# Patient Record
Sex: MALE | Race: WHITE | NOT HISPANIC OR LATINO | Employment: FULL TIME | URBAN - METROPOLITAN AREA
[De-identification: names, ages, dates, MRNs, and addresses within clinical notes are randomized per-mention and may not be internally consistent; named-entity substitution may affect disease eponyms.]

---

## 2019-04-02 ENCOUNTER — HOSPITAL ENCOUNTER (OUTPATIENT)
Dept: NEUROLOGY | Facility: CLINIC | Age: 54
Setting detail: THERAPIES SERIES
Discharge: STILL ADMITTED OR ADMINISTRATIVE | End: 2019-04-02
Attending: NURSE PRACTITIONER

## 2019-04-02 DIAGNOSIS — F07.81 POST CONCUSSION SYNDROME: ICD-10-CM

## 2019-04-02 DIAGNOSIS — R79.89 LOW VITAMIN B12 LEVEL: ICD-10-CM

## 2019-04-02 DIAGNOSIS — Z76.89 RETURN TO WORK EVALUATION: ICD-10-CM

## 2019-04-02 DIAGNOSIS — R53.83 FATIGUE, UNSPECIFIED TYPE: ICD-10-CM

## 2019-04-02 DIAGNOSIS — G44.309 POST-CONCUSSION HEADACHE: ICD-10-CM

## 2019-04-02 DIAGNOSIS — F06.4 ANXIETY DISORDER DUE TO MEDICAL CONDITION: ICD-10-CM

## 2019-04-02 LAB
ALBUMIN SERPL-MCNC: 4 G/DL (ref 3.5–5)
ALP SERPL-CCNC: 61 U/L (ref 45–120)
ALT SERPL W P-5'-P-CCNC: 21 U/L (ref 0–45)
ANION GAP SERPL CALCULATED.3IONS-SCNC: 11 MMOL/L (ref 5–18)
AST SERPL W P-5'-P-CCNC: 20 U/L (ref 0–40)
BASOPHILS # BLD AUTO: 0.1 THOU/UL (ref 0–0.2)
BASOPHILS NFR BLD AUTO: 1 % (ref 0–2)
BILIRUB SERPL-MCNC: 0.4 MG/DL (ref 0–1)
BUN SERPL-MCNC: 9 MG/DL (ref 8–22)
CALCIUM SERPL-MCNC: 9.8 MG/DL (ref 8.5–10.5)
CHLORIDE BLD-SCNC: 101 MMOL/L (ref 98–107)
CO2 SERPL-SCNC: 28 MMOL/L (ref 22–31)
CREAT SERPL-MCNC: 0.9 MG/DL (ref 0.7–1.3)
CRP SERPL HS-MCNC: 2.4 MG/L (ref 0–3)
EOSINOPHIL # BLD AUTO: 0.1 THOU/UL (ref 0–0.4)
EOSINOPHIL NFR BLD AUTO: 1 % (ref 0–6)
ERYTHROCYTE [DISTWIDTH] IN BLOOD BY AUTOMATED COUNT: 13.1 % (ref 11–14.5)
ERYTHROCYTE [SEDIMENTATION RATE] IN BLOOD BY WESTERGREN METHOD: 6 MM/HR (ref 0–15)
FOLATE SERPL-MCNC: 4.8 NG/ML
GFR SERPL CREATININE-BSD FRML MDRD: >60 ML/MIN/1.73M2
GLUCOSE BLD-MCNC: 86 MG/DL (ref 70–125)
HCT VFR BLD AUTO: 46.3 % (ref 40–54)
HGB BLD-MCNC: 15.4 G/DL (ref 14–18)
LYMPHOCYTES # BLD AUTO: 1.6 THOU/UL (ref 0.8–4.4)
LYMPHOCYTES NFR BLD AUTO: 26 % (ref 20–40)
MAGNESIUM SERPL-MCNC: 2.6 MG/DL (ref 1.8–2.6)
MCH RBC QN AUTO: 31.8 PG (ref 27–34)
MCHC RBC AUTO-ENTMCNC: 33.3 G/DL (ref 32–36)
MCV RBC AUTO: 96 FL (ref 80–100)
MONOCYTES # BLD AUTO: 0.5 THOU/UL (ref 0–0.9)
MONOCYTES NFR BLD AUTO: 8 % (ref 2–10)
NEUTROPHILS # BLD AUTO: 3.9 THOU/UL (ref 2–7.7)
NEUTROPHILS NFR BLD AUTO: 63 % (ref 50–70)
PLATELET # BLD AUTO: 319 THOU/UL (ref 140–440)
PMV BLD AUTO: 10.9 FL (ref 8.5–12.5)
POTASSIUM BLD-SCNC: 4.1 MMOL/L (ref 3.5–5)
PROT SERPL-MCNC: 7.2 G/DL (ref 6–8)
RBC # BLD AUTO: 4.84 MILL/UL (ref 4.4–6.2)
SODIUM SERPL-SCNC: 140 MMOL/L (ref 136–145)
TSH SERPL DL<=0.005 MIU/L-ACNC: 0.78 UIU/ML (ref 0.3–5)
VIT B12 SERPL-MCNC: >2000 PG/ML (ref 213–816)
WBC: 6.2 THOU/UL (ref 4–11)

## 2019-04-03 LAB — 25(OH)D3 SERPL-MCNC: 28.8 NG/ML (ref 30–80)

## 2019-04-10 ENCOUNTER — HOSPITAL ENCOUNTER (OUTPATIENT)
Dept: PHYSICAL THERAPY | Age: 54
Setting detail: THERAPIES SERIES
Discharge: STILL ADMITTED OR ADMINISTRATIVE | End: 2019-04-10
Attending: NURSE PRACTITIONER

## 2019-04-10 DIAGNOSIS — R42 DIZZINESS: ICD-10-CM

## 2019-04-10 DIAGNOSIS — R26.89 UNSTABLE BALANCE: ICD-10-CM

## 2019-04-10 DIAGNOSIS — R53.83 FATIGUE, UNSPECIFIED TYPE: ICD-10-CM

## 2019-04-15 ENCOUNTER — HOSPITAL ENCOUNTER (OUTPATIENT)
Dept: NEUROLOGY | Facility: CLINIC | Age: 54
Setting detail: THERAPIES SERIES
Discharge: STILL ADMITTED OR ADMINISTRATIVE | End: 2019-04-15
Attending: PSYCHOLOGIST

## 2019-04-15 DIAGNOSIS — R53.83 FATIGUE, UNSPECIFIED TYPE: ICD-10-CM

## 2019-04-22 ENCOUNTER — AMBULATORY - HEALTHEAST (OUTPATIENT)
Dept: NEUROLOGY | Facility: CLINIC | Age: 54
End: 2019-04-22

## 2019-04-25 ENCOUNTER — HOSPITAL ENCOUNTER (OUTPATIENT)
Dept: PHYSICAL THERAPY | Age: 54
Setting detail: THERAPIES SERIES
Discharge: STILL ADMITTED OR ADMINISTRATIVE | End: 2019-04-25
Attending: PHYSICAL THERAPIST

## 2019-04-25 DIAGNOSIS — R26.89 UNSTABLE BALANCE: ICD-10-CM

## 2019-04-25 DIAGNOSIS — R42 DIZZINESS: ICD-10-CM

## 2019-04-29 ENCOUNTER — AMBULATORY - HEALTHEAST (OUTPATIENT)
Dept: NEUROLOGY | Facility: CLINIC | Age: 54
End: 2019-04-29

## 2019-04-29 DIAGNOSIS — F07.81 POST CONCUSSION SYNDROME: ICD-10-CM

## 2019-04-30 ENCOUNTER — HOSPITAL ENCOUNTER (OUTPATIENT)
Dept: NEUROLOGY | Facility: CLINIC | Age: 54
Setting detail: THERAPIES SERIES
Discharge: STILL ADMITTED OR ADMINISTRATIVE | End: 2019-04-30
Attending: NURSE PRACTITIONER

## 2019-04-30 DIAGNOSIS — G44.309 POST-CONCUSSION HEADACHE: ICD-10-CM

## 2019-04-30 DIAGNOSIS — Z76.89 RETURN TO WORK EVALUATION: ICD-10-CM

## 2019-04-30 DIAGNOSIS — F06.4 ANXIETY DISORDER DUE TO MEDICAL CONDITION: ICD-10-CM

## 2019-04-30 DIAGNOSIS — F07.81 POST CONCUSSION SYNDROME: ICD-10-CM

## 2019-05-02 ENCOUNTER — HOSPITAL ENCOUNTER (OUTPATIENT)
Dept: PHYSICAL THERAPY | Age: 54
Setting detail: THERAPIES SERIES
Discharge: STILL ADMITTED OR ADMINISTRATIVE | End: 2019-05-02
Attending: PHYSICAL THERAPIST

## 2019-05-02 DIAGNOSIS — R26.89 UNSTABLE BALANCE: ICD-10-CM

## 2019-05-02 DIAGNOSIS — R42 DIZZINESS: ICD-10-CM

## 2019-05-09 ENCOUNTER — HOSPITAL ENCOUNTER (OUTPATIENT)
Dept: PHYSICAL THERAPY | Age: 54
Setting detail: THERAPIES SERIES
Discharge: STILL ADMITTED OR ADMINISTRATIVE | End: 2019-05-09
Attending: PHYSICAL THERAPIST

## 2019-05-09 DIAGNOSIS — R42 DIZZINESS: ICD-10-CM

## 2019-05-09 DIAGNOSIS — R26.89 UNSTABLE BALANCE: ICD-10-CM

## 2019-05-23 ENCOUNTER — HOSPITAL ENCOUNTER (OUTPATIENT)
Dept: PHYSICAL THERAPY | Age: 54
Setting detail: THERAPIES SERIES
Discharge: STILL ADMITTED OR ADMINISTRATIVE | End: 2019-05-23
Attending: PHYSICAL THERAPIST

## 2019-05-23 DIAGNOSIS — R42 DIZZINESS: ICD-10-CM

## 2019-05-23 DIAGNOSIS — R26.89 UNSTABLE BALANCE: ICD-10-CM

## 2019-06-06 ENCOUNTER — HOSPITAL ENCOUNTER (OUTPATIENT)
Dept: PHYSICAL THERAPY | Age: 54
Setting detail: THERAPIES SERIES
Discharge: STILL ADMITTED OR ADMINISTRATIVE | End: 2019-06-06
Attending: PHYSICAL THERAPIST

## 2019-06-06 DIAGNOSIS — R26.89 UNSTABLE BALANCE: ICD-10-CM

## 2019-06-06 DIAGNOSIS — R42 DIZZINESS: ICD-10-CM

## 2019-06-17 ENCOUNTER — HOSPITAL ENCOUNTER (OUTPATIENT)
Dept: NEUROLOGY | Facility: CLINIC | Age: 54
Setting detail: THERAPIES SERIES
Discharge: STILL ADMITTED OR ADMINISTRATIVE | End: 2019-06-17
Attending: PSYCHOLOGIST

## 2019-06-17 DIAGNOSIS — R53.83 FATIGUE, UNSPECIFIED TYPE: ICD-10-CM

## 2019-06-17 DIAGNOSIS — F07.81 POST CONCUSSION SYNDROME: ICD-10-CM

## 2019-06-19 ENCOUNTER — HOSPITAL ENCOUNTER (OUTPATIENT)
Dept: PHYSICAL THERAPY | Age: 54
Setting detail: THERAPIES SERIES
Discharge: STILL ADMITTED OR ADMINISTRATIVE | End: 2019-06-19
Attending: PHYSICAL THERAPIST

## 2019-06-19 DIAGNOSIS — R26.89 UNSTABLE BALANCE: ICD-10-CM

## 2019-06-19 DIAGNOSIS — R42 DIZZINESS: ICD-10-CM

## 2019-07-08 ENCOUNTER — HOSPITAL ENCOUNTER (OUTPATIENT)
Dept: NEUROLOGY | Facility: CLINIC | Age: 54
Setting detail: THERAPIES SERIES
Discharge: STILL ADMITTED OR ADMINISTRATIVE | End: 2019-07-08
Attending: NURSE PRACTITIONER

## 2019-07-08 DIAGNOSIS — R41.3 MEMORY DIFFICULTIES: ICD-10-CM

## 2019-07-08 DIAGNOSIS — G44.309 POST-CONCUSSION HEADACHE: ICD-10-CM

## 2019-07-08 DIAGNOSIS — G47.00 INSOMNIA, UNSPECIFIED TYPE: ICD-10-CM

## 2019-07-08 DIAGNOSIS — Z76.89 RETURN TO WORK EVALUATION: ICD-10-CM

## 2019-07-08 DIAGNOSIS — F06.4 ANXIETY DISORDER DUE TO MEDICAL CONDITION: ICD-10-CM

## 2019-07-08 DIAGNOSIS — F07.81 POST CONCUSSION SYNDROME: ICD-10-CM

## 2019-09-09 ENCOUNTER — HOSPITAL ENCOUNTER (OUTPATIENT)
Dept: NEUROLOGY | Facility: CLINIC | Age: 54
Setting detail: THERAPIES SERIES
Discharge: STILL ADMITTED OR ADMINISTRATIVE | End: 2019-09-09
Attending: NURSE PRACTITIONER

## 2019-09-09 DIAGNOSIS — F07.81 POSTCONCUSSION SYNDROME: ICD-10-CM

## 2019-09-09 DIAGNOSIS — R41.840 ATTENTION AND CONCENTRATION DEFICIT: ICD-10-CM

## 2019-09-09 DIAGNOSIS — G44.309 POST-CONCUSSION HEADACHE: ICD-10-CM

## 2019-09-09 DIAGNOSIS — K91.1 DUMPING SYNDROME: ICD-10-CM

## 2019-09-09 DIAGNOSIS — Z76.89 RETURN TO WORK EVALUATION: ICD-10-CM

## 2019-09-09 DIAGNOSIS — F06.4 ANXIETY DISORDER DUE TO MEDICAL CONDITION: ICD-10-CM

## 2019-09-09 DIAGNOSIS — R41.3 MEMORY DIFFICULTIES: ICD-10-CM

## 2019-09-09 DIAGNOSIS — G47.00 INSOMNIA, UNSPECIFIED TYPE: ICD-10-CM

## 2019-10-15 ENCOUNTER — HOSPITAL ENCOUNTER (OUTPATIENT)
Dept: NEUROLOGY | Facility: CLINIC | Age: 54
Setting detail: THERAPIES SERIES
Discharge: STILL ADMITTED OR ADMINISTRATIVE | End: 2019-10-15
Attending: NURSE PRACTITIONER

## 2019-10-15 DIAGNOSIS — Z76.89 RETURN TO WORK EVALUATION: ICD-10-CM

## 2019-10-15 DIAGNOSIS — F06.4 ANXIETY DISORDER DUE TO MEDICAL CONDITION: ICD-10-CM

## 2019-10-15 DIAGNOSIS — R41.840 ATTENTION AND CONCENTRATION DEFICIT: ICD-10-CM

## 2019-10-15 DIAGNOSIS — F07.81 POSTCONCUSSION SYNDROME: ICD-10-CM

## 2019-10-15 DIAGNOSIS — R41.3 MEMORY DIFFICULTY: ICD-10-CM

## 2019-10-15 DIAGNOSIS — G47.00 INSOMNIA, UNSPECIFIED TYPE: ICD-10-CM

## 2019-10-15 DIAGNOSIS — G44.309 POST-CONCUSSION HEADACHE: ICD-10-CM

## 2019-11-20 ENCOUNTER — HOSPITAL ENCOUNTER (OUTPATIENT)
Dept: NEUROLOGY | Facility: CLINIC | Age: 54
Setting detail: THERAPIES SERIES
Discharge: STILL ADMITTED OR ADMINISTRATIVE | End: 2019-11-20
Attending: NURSE PRACTITIONER

## 2019-11-20 DIAGNOSIS — R41.840 ATTENTION AND CONCENTRATION DEFICIT: ICD-10-CM

## 2019-11-20 DIAGNOSIS — G47.00 INSOMNIA, UNSPECIFIED TYPE: ICD-10-CM

## 2019-11-20 DIAGNOSIS — F06.4 ANXIETY DISORDER DUE TO MEDICAL CONDITION: ICD-10-CM

## 2019-11-20 DIAGNOSIS — Z76.89 RETURN TO WORK EVALUATION: ICD-10-CM

## 2019-11-20 DIAGNOSIS — K91.1 DUMPING SYNDROME: ICD-10-CM

## 2019-11-20 DIAGNOSIS — Z02.6 ENCOUNTER RELATED TO WORKER'S COMPENSATION CLAIM: ICD-10-CM

## 2019-11-20 DIAGNOSIS — G44.309 POST-CONCUSSION HEADACHE: ICD-10-CM

## 2019-11-20 DIAGNOSIS — R53.83 FATIGUE, UNSPECIFIED TYPE: ICD-10-CM

## 2019-11-20 DIAGNOSIS — F07.81 POST CONCUSSION SYNDROME: ICD-10-CM

## 2019-12-03 ENCOUNTER — TRANSCRIBE ORDERS (OUTPATIENT)
Dept: OTHER | Age: 54
End: 2019-12-03

## 2019-12-03 DIAGNOSIS — K91.1 DUMPING SYNDROME: Primary | ICD-10-CM

## 2019-12-11 ENCOUNTER — DOCUMENTATION ONLY (OUTPATIENT)
Dept: GASTROENTEROLOGY | Facility: CLINIC | Age: 54
End: 2019-12-11

## 2019-12-11 NOTE — LETTER
December 18, 2019      TO: Dixon Good  [address]  [facility] [address]         I have reviewed your medical records and thank you for your time and effort in submitting them.    At this time, the demand for our services is very high and we are sorry that we cannot see you at this time.  We recommend that you see your previous gastroenterologist or the contact a local GI provider from the list below.      Sincerely,  Hui Landis MD  Department of Gastroenterology [facility]  [phone]    [facility]  [phone]    [facility]  [phone]    [facility]  [phone]      Sincerely,      Hui Landis MD

## 2019-12-11 NOTE — PROGRESS NOTES
[facility] Provider and [facility]     Diagnosis:  Dumping syndrome     GI notes or primary provider notes related to GI problem:   Y     Pathology Reports: N    Recent Lab Reports: Y    Radiology Reports (CT/MRI) : Y    Endoscopy:  N    Colonoscopy: N    Notes: Recs fwd to  Boby GAVIRIA to review 12/11/19 1:14PM        Referral Date: 12/11/19    Date complete records received and sent for review:     Date records scanned into epic:     Provider Review Date:     Date review routed back to sender:     Letter sent:     Notes:

## 2019-12-16 NOTE — PROGRESS NOTES
REFERRAL REVIEW FORM    Is this a second opinion from another GI physician?  (If yes, OK to refer to for an MD only clinic visit)  No    Reason for referral: dumping syndrome    Date records reviewed: 12/2019    Previous work up:    Labs  CT scan    Summary of pertinent GI work-up:  S/p nissen fundoplication, c/b dumping syndrome on Sandostatin injections. Also had hiatal hernia repair and appendectomy.  Per records, patient reporting dumping syndrome, but we do not have records to support this.     CT a/p 5/2018 showed    1. Continued slight inflammatory stranding in the low pelvis at the level of the rectosigmoid and presacral region, with improved bowel wall thickening from the 5/25/2018 exam. Trace fluid in the pelvis, nonspecific. Direct visualization may be warranted.  2. Diverticulosis without evidence of active inflammation.    Recommendation:    Do not schedule--send letter to patient with other list of GI providers - CC to PCP    Comments:   Patient has postsurgical dumping syndrome, despite Sandostatin therapy.  He is unlikely to benefit from a GI clinic visit, given medical options have been exhausted.  If he has a truly refractory condition, surgical evaluation may be needed.                                      no

## 2020-01-02 ENCOUNTER — HOSPITAL ENCOUNTER (OUTPATIENT)
Dept: NEUROLOGY | Facility: CLINIC | Age: 55
Setting detail: THERAPIES SERIES
Discharge: STILL ADMITTED OR ADMINISTRATIVE | End: 2020-01-02
Attending: NURSE PRACTITIONER

## 2020-01-02 DIAGNOSIS — F07.81 POST CONCUSSION SYNDROME: ICD-10-CM

## 2020-01-02 DIAGNOSIS — Z02.6 ENCOUNTER RELATED TO WORKER'S COMPENSATION CLAIM: ICD-10-CM

## 2020-01-02 DIAGNOSIS — R53.83 FATIGUE, UNSPECIFIED TYPE: ICD-10-CM

## 2020-01-02 DIAGNOSIS — G47.00 INSOMNIA, UNSPECIFIED TYPE: ICD-10-CM

## 2020-01-02 DIAGNOSIS — F06.4 ANXIETY DISORDER DUE TO MEDICAL CONDITION: ICD-10-CM

## 2020-01-02 DIAGNOSIS — G44.309 POST-CONCUSSION HEADACHE: ICD-10-CM

## 2020-03-21 ENCOUNTER — COMMUNICATION - HEALTHEAST (OUTPATIENT)
Dept: NEUROLOGY | Facility: CLINIC | Age: 55
End: 2020-03-21

## 2020-03-21 DIAGNOSIS — F06.4 ANXIETY DISORDER DUE TO MEDICAL CONDITION: ICD-10-CM

## 2021-05-27 NOTE — PROGRESS NOTES
Assessment:     1. Concussion, without loss of consciousness.    2. Post concussion syndrome  3. Dizziness  4. Headaches    Plan:     Ordered today: PT to evaluate and treat, Wellbutrin and amitriptyline, neuropsych is on hold until Workmen's Comp. Approves, Labs        Neuropsychological assessment   Yes  (2 hours), can count case this will be on hold Workmen's Comp. approves  Pain control for headaches - Tylenol only due to rebound headaches, Kiley/blue tinted glasses  Current PT  No      PT to evaluate and treat  Yes  Current OT  No     OT to evaluate and treat  No  Current ST  No     ST to evaluate and treat  No  Current care        Psychiatrist currently No  Past:  No       Psychologist currently No  Past:  No       Primary: Currently Yes                   Referral to psychology No  MRI/CT Completed  Yes     Ordered today : No  Labs Completed  No        Ordered today : Yes  Sleeping Problems-monitor, sleep hygiene          Currently on medication  No           New med  Yes, amitriptyline  Anxiety due to concussion - monitor        Currently on medication  No          New med  Yes, Wellbutrin     Decreased concentration and focus - monitor        Currently on medication No         New med  Yes, Wellbutrin     Work note written today   Yes     Date of accident: 3/1/19  Workman's Comp   Yes   [facility]   No    Discussed: Yes   Present: No         Discussed: RED FLAGS NEEDING ACUTE EMERGENCY MANAGEMENT:                          Headaches that worsen                          Seizures                          Focal Neurologic Signs                          Drowsiness/Lethargy                          Repeated vomiting                          Slurred Speech                          Inability to recognize people/places                          Increasing confusion/irritability                          Weakness/numbness                          Neck pain                          Unusual behavioral change                           Change in level of consciousness    Subjective:          HPI  He is a 54 y.o. male who presents with a blunt/closed head injury which he sustained while at work on 3/11/19.  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 He hit his head the ball of the trailer hitch, and then maybe again on the ground. He went inside and started working on a computer, he had a headache and started getting dizzy and nauseated so went to the ER. He had a CT scan preformed, diagnosed him with a concussion and sent him home to rest for a couple of days    Most severe symptoms: he has had a headache and ringing in his ear     Injury Description:               Was there a forcible blow to the head?:                     Yes                          Evidence of intracranial injury or skull fracture?:        No                            Location of Impact on the head:        Left side of head                               Retrograde Amnesia (loss of memory of events before the injury)?:  No  Anterograde Amnesia (loss of memory of events following injury)?:  No  Number of previous head injuries.                                                       0  Loss of Consciousness:                                                                      No    Early Signs:  Symptoms were first noted when     About 15 minutes later                           Appears dazed or stunned:                                         Yes              Word finding issues/trouble completing thought         Yes              Confusion about events:                                             No              Seizures:                                                                     No                          Headache                                                                   Yes              Body pain                                                                    No              Nausea                                                                       Yes    Headaches:   Significant ongoing headaches Yes  Headaches are Daily and Continuously    He indicated that approximately every three  days these headaches are particularly bad such that he would rate them as a 8 on a 1-10 rating scale. On average he would describe his headaches as being at about a 4/10. At his best his headaches are a 2/10. The patient reports computers, stress and light makes his symptoms worse, and rest makes his symptoms better. He indicated that he takes acetaminophen (Tylenol) and it helps, but not always.     Physical Symptoms:  Headache-Yes      Resolved No           Improved since accident Yes   Nausea- Yes      Resolved No        Improved since accident Yes      Vomiting - No        Balance problems - Yes       Resolved No Improved since accident Yes   Dizziness - Yes      Resolved No          Improved since accident No   Visual problems - No        Fatigue - Yes     Resolved No           Improved since accident Yes   Sensitivity to light - Yes     Resolved No         Improved since accident No   Sensitivity to sound - No      Numbness/tingling - No          Cognitive Symptoms  Feeling mentally foggy - Yes         Resolved No       Improved since accident Yes   Feeling slowed down - Yes         Resolved No         Improved since accident Yes   Difficulty Concentrating- Yes        Resolved No        Improved since accident Yes   Difficulty remembering - Yes         Resolved No       Improved since accident Yes     Emotional Symptoms  Irritability - No          Sadness-   No       More emotional - No        Nervousness/anxiety - No             Psychiatric History:  Anxiety - No  Depression - No  Sleep Disorders - No  The patient denies being a victim of abuse.     Ever Hospitalized for mental health:             No  Any thought of hurting self or others now?   No  Any history of  hurting self or others?            No    Family Psychiatric History:  Mother's side                              No  Father's side                               No  Adopted                                      No    Sleep History:  Drowsiness- Yes         Resolved No        Improved since accident Yes  Sleep less than usual - Yes  Sleep more than usual - No  Trouble falling asleep - Yes       Resolved No        Improved since accident Yes   Does the patient wake feeling rested - No       Resolved No         Improved since accident No      Previous concussions  No     Migraine Headaches      Patient history of migraines.    Yes      Family history of migraines    Yes    Exertion:         Do the above stated symptoms worsen with physical activity? Yes        Do the above stated symptoms worsen with cognitive activity? Yes         Work/School        Do the above stated symptoms worsen with school/work?        Yes        Have your returned to work/school?            Yes, he was told to take a week off, but after 3 days tried to drive and got really dizzy, so took full week off and then went on vacation        Any days off?                                Yes, 2 weeks          There are no active problems to display for this patient.    No past medical history on file.  No past surgical history on file.  No family history on file.  Current Outpatient Medications   Medication Sig Dispense Refill     ibuprofen (ADVIL,MOTRIN) 600 MG tablet Take 600 mg by mouth.       losartan (COZAAR) 100 MG tablet   0     octreotide (SANDOSTATIN LAR DEPOT) 30 mg IM injection Inject 30 mg into the shoulder, thigh, or buttocks.       octreotide (SANDOSTATIN) 100 mcg/mL Soln Inject 100 mcg under the skin.       ondansetron (ZOFRAN ODT) 4 MG disintegrating tablet Take 1 tablet (4 mg total) by mouth every 8 (eight) hours as needed for nausea. 10 tablet 0     promethazine (PHENERGAN) 25 MG tablet   0     SUMAtriptan (IMITREX) 100 MG tablet Take  100 mg by mouth.       SUMAtriptan (IMITREX) 100 MG tablet   0     SUMAtriptan (IMITREX) 50 MG tablet   3     urea (CARMOL) 40 % Crea Apply to affected area daily.       No current facility-administered medications for this encounter.        Allergies   Allergen Reactions     Flagyl [Metronidazole]      Social History     Socioeconomic History     Marital status:      Spouse name: Not on file     Number of children: Not on file     Years of education: Not on file     Highest education level: Not on file   Occupational History     Not on file   Social Needs     Financial resource strain: Not on file     Food insecurity:     Worry: Not on file     Inability: Not on file     Transportation needs:     Medical: Not on file     Non-medical: Not on file   Tobacco Use     Smoking status: Not on file   Substance and Sexual Activity     Alcohol use: Not on file     Drug use: Not on file     Sexual activity: Not on file   Lifestyle     Physical activity:     Days per week: Not on file     Minutes per session: Not on file     Stress: Not on file   Relationships     Social connections:     Talks on phone: Not on file     Gets together: Not on file     Attends Rastafari service: Not on file     Active member of club or organization: Not on file     Attends meetings of clubs or organizations: Not on file     Relationship status: Not on file     Intimate partner violence:     Fear of current or ex partner: Not on file     Emotionally abused: Not on file     Physically abused: Not on file     Forced sexual activity: Not on file   Other Topics Concern     Not on file   Social History Narrative     Not on file       The following portions of the patient's history were reviewed and updated as appropriate: allergies, current medications, past family history, past medical history, past social history, past surgical history and problem list.    Smoke History:      Review of Systems  A comprehensive review of systems was negative  "except for: headache, ear ringing    Patient History  Patient was referred to the concussion clinic by ER. The patient was born in [place], and came to [place] when he was a child and has lived here for most of his life. He is currently living with his wife of 35  years in their family home.     Currently employed as a  of an auto body store and works at [employer].  The concussion symptoms are limiting his ability to work, his symptoms will flair up when at work. His concussion is work related.   Personal interests include water skiing, hunt, and scuba dive. The patient has not been able to do these activities since his injury, he did go scuba diving and \"pushed through it\". He normally exercises never. He reports having good grades through high school and college. He reports learning best by watching and reading. He denies any developmental problems, learning disabilities, or history of ADHD. He does not have any culture or Nondenominational concerns regarding his treatment. The patient did not see his primary after the injury   The patient reports that his activities since the injury have been trying to get back to his normal activity.  Patient reports being 85 percent back to his normal activity.  The patient denies any unexplained weight loss or gain. The patient reports that the symptoms wake him up at night. He denies feeling down, depressed, or hopeless. The patient reports being bothered by having little interest or pleasure in doing things.     Objective:       Discussion was held with the patient today regarding concussion in general including types of injury, symptoms that are common, treatment and variability in time to recover. I also provided written information about concussion and symptoms that would apply to him in his concussion folder. Education about concussion symptoms and length of time it would take the patient to recover was also given to the patient.  I have reassured the patient his " symptoms are very common when a concussion is present and will improve with time. We discussed the risks and benefits of the medication including risk of worsening depression with medication adjustments and even the possibility of emergence of suicidal ideations.       Total time spent with the patient today was 60 minutes with greater than 50% of the time spent in counseling and care coordination. The patient will return in about 4 weeks to this clinic for further assessment and treatment. He agrees to call before then with any questions, concerns or problems. We will assess for the appropriateness of possible psychotropic medication trials/changes. The patient will seek out appropriate emergency services should that become necessary.I will be available if any questions, concerns, or problems that arise.     Physical Exam: General appearance: alert, appears stated age, cooperative and no distress. Yes  Sensitivity to light. Yes  Head: Normocephalic, without obvious abnormality, atraumatic Yes  Neck:  Full ROM  Yes with pain or stiffness Yes    Neurologic:   Mental status: Alert, oriented, thought content appropriate, affect: mood-congruent. Recent and remote memory grossly intact.  Yes  Speech is clear and fluent with no obvious word finding or paraphasic errors. Yes  Pupils are equal and react to light direct and consensual accommodation.Yes  EOMs are intact   Yes  nystagmus. No   Exophoria/exotropia noted. No  Visual fields are grossly full. Yes  VOR grossly intact. Yes  Saccade and smooth pursuit grossly intact. Yes  Full facial movements, Yes  Tongue protrudes midline soft palate rises symmetrically. Yes  Shoulder shrug is intact. Yes  Strength is 5/5, No pronator drift. Yes  Accurate finger to nose and sensation is intact to double simultaneous stimulation.Yes   Reflexes are symmetrical Yes  Toes are down going. Yes  Romberg is positive   Able to toe, heel, and tandem walk without difficulty No      Mental  Status Examination  Patient is casually dressed and seated for evaluation. He is cooperative with questioning and eye contact is good. He is fully engaged in conversation today. He is alert and fully oriented. Speech is normal. Thought processes normal with normal prehension and expression. Thoughts are organized and linear. Content is pertinent to the conversation and without evidence of auditory or visual hallucinations. No delusional ideation. Affect/mood is euthymic-bright, even. Gen. fund of knowledge, insight and memory are normal.

## 2021-05-27 NOTE — PROGRESS NOTES
"Physical Therapy  PHYSICAL THERAPY INITIAL CONCUSSION ASSESSMENT    Date: 4/10/2019                        Date of Injury: 3/11/19    Subjective:   Mechanism of Injury:  Per ED and neuropsychological report: He patient reports that he was walking into work when he slipped on ice and fell backwards hitting and bouncing the left side of his head off the trailer hitch of his truck. Patient did not lose consciousness. He describes the event well and has no retrograde amnesia. Shortly after the fall he developed a headache, dizziness, blurred vision, nausea and bilateral ear ringing so he drove himself to the ER.  Went on trip to [place] immediately after and did activities such as helicopter ride, boat ride, scuba diving, etc. and had increased symptoms of dizziness/headache with all activities.     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     Current level of function:  Still working full time, but has moved his work station to a room with dimmed lights and just one computer instead of two.  Plans to begin water skiing as soon as the ice is off the water and has more carpentry projects to begin in the spring.      Medical History: Bilateral shoulder surgical repairs, has \"broken almost every bone in my body\", negative hx of concussions, poor sleep hygiene at baseline (\"I have a hard time shutting off\")     Ongoing symptoms:  Headaches, dizziness, nausea, bilateral tinnitus      Headaches:     -Frequency:  Daily, most noticeable in the morning and when concentrating at work (on computer screen)    -Location:  Behind eyes, changes between R, L and Bilat    -Description of pain:  Sharp to dull   -Exacerbating factors:  Bright lights, concentrating on things, loud noises   -Relieving factors:  Rest, dark/dimmed lighting   -Headache history:  Hx of migraines since 11 y/o   -Current: " " 2/10, Best:  2/10, Worst:  6-7/10    Neck Pain:   -No new complaints since injury; 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 that occasionally gives him pain but it is tolerable     Dizziness:   -Frequency: Daily    -Description of dizziness without using the word \"dizzy\": Lightheaded, nauseous, unsteady    -Exacerbating factors:  Looking back and forth between computer screens, focusing on screen while scrolling through large documents, with head turns, with driving   -Relieving factors: Subsides on its own after a few minutes   -Dizziness history:  New since injury    -Current:  1/10, Best:  1/10, Worst:  5/10    Balance:   -When do you lose balance?:  No balance deficits at baseline, but since injury he has noticed more difficulty with tandem/feet together activities; No changes in balance with ADL's.     -Recent falls:  None    Pain rating: see above  Location: see above       Objective:  ROM: Cervical       Flexion: WNL        Extension: WNL        Right Rotation: WNL       Left Rotation: WNL       Right Side Bending: WNL      Left Side Bending: WNL         Postural Assessment: Slight rounded shoulders, posterior pelvic tilt     Vestibular/Balance  Gait:Normal    Vertebral Basilar Artery: NT   Ocular AROM: Normal  Smooth Pursuit: Normal; provoked dizziness symptom  Saccades: Normal    Positional Tests: R Rj-Hallpike NT                                              L Rj--Hallpike NT      VOR Cancellation: Positive saccadic movements, mild symptom provocation Slow VOR: Negative  Right Head Impulse Test: Positive (more significant than the L)  Left Head Impulse Test: Positive    Dynamic Visual Acuity: 2 line change     Infrared goggle assessment:  -Gaze induced nystagmus R - Negative  -Gaze induced nystagmus L - Negative  -Pressure induced nystagmus R - Negative  -Pressure induced nystagmus L - Negative  -Head shake nystagmus - Positive, with 3 beats to R      Sensory Organization Tests:  MCTSIB: NSEO Normal       " PSEO Normal       NSEC Normal                  PSEC Normal; mild postural sway    (Functional Gait Assessment) FGA: Tested   Functional Gait Assessment:  Total Score: 22/30   Gait Level surface: 3 (Normal) - 4.99 sec   Change in Gait Speed: 3 (Normal) - mild increase in dizziness symptoms   Gait with horizontal head turns: 0 (Severe) - slowed pace, staggers, LOB corrected with Kermit from therapist, significant increase in dizziness symptoms   Gait with vertical head turns:  1 (Moderate) - slowed pace, mod path deviation, mod increase in dizziness symptoms    Gait and Pivot Turn: 3 (Normal)   Step Over Obstacle: 3 (Normal)   Gait with Narrow Base of Support: 3 (Normal) - mild increase in dizziness symptoms   Gait with Eyes Closed: 1 (Severe) - 5.82 sec, significant path deviation to R    Ambulating Backwards: 2 (Mild) - 6.97 sec, slowed pace   Steps: 3 (Normal)       Single Leg Stance: Not Tested        Initial Treatment: 1 NR (10 min)   -Gaze stabilization x 1 in standing position with blank background and target 2-3' away and standing position:   Horizontal x 1 rep - 15 sec    Vertical x 1 rep - 18 sec    -Issued gaze stab to HEP  -Education on vestibular system and hypofunction.  All questions addressed.  -Instructed to maintain current activity level    Therapist Recommendations:  Impairments identified; See POC for goals and scheduled for subsequent visits      Rx Units: Eval, NR  Total Minutes: 60    SPT under direct supervision of PT with PT directing treatment and plan of care.    Mike Figueroa, PT, DPT, NCS

## 2021-05-27 NOTE — PROGRESS NOTES
Concussion date/cause of injury: AROUND MARCH 11, 2019- slipped on ice and fell hitting head onto trailer hitch right about the left ear.  NO LOC.    How have you been doing since we last saw you? any concerns? Symptoms? Mild Nausea, HA's, Ringing in the ears, Short term memory loss, Dizziness, Sleeping inconsistent.     NEW PT'S: Are you currently taking any PT or OT at any other facility?   None

## 2021-05-27 NOTE — PROGRESS NOTES
.The patient was seen for a neuropsychological evaluation for the purposes of diagnostic clarification and treatment planning. 35 minutes of face-to-face testing were provided by this writer. An additional 12 minutes were spent scoring and compiling test results.The patient was cooperative with testing. No concerns were brought to my attention. Please see Dr. Jacques's report for a detailed description of the charges and interpretation and integration of the findings.
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
## 2021-05-28 ENCOUNTER — RECORDS - HEALTHEAST (OUTPATIENT)
Dept: ADMINISTRATIVE | Facility: CLINIC | Age: 56
End: 2021-05-28

## 2021-05-28 NOTE — PROGRESS NOTES
Assessment:     1. Post Concussion Syndrome  2. Post Concussion Headache  3. Anxiety d/t concussion    Discussed: RED FLAGS NEEDING ACUTE EMERGENCY MANAGEMENT:                          Headaches that worsen                          Seizures                          Focal Neurologic Signs                          Drowsiness/Lethargy                          Repeated vomiting                          Slurred Speech                          Inability to recognize people/places                          Increasing confusion/irritability                          Weakness/numbness                          Neck pain                          Unusual behavioral change                          Change in level of consciousness      The patient returns to the concussion clinic for a follow up appointment, he was last seen by me on 4/12/19, where I prescribed amitriptyline and Wellbutrin, and referred the patient for PT and neuropsychological testing.   Patient reports he was seen in the ER due to the dumping syndrome.  The patient reports pushing himself a bit at work, but he does realize how he needs to slow down and take breaks in order to improve his symptoms.  Amitriptyline is helping him sleep and now he is waking feeling rested.  Wellbutrin is also decreased his irritability and help his focus.  Overall the patient reports improvement in physical, cognitive, and emotional concussive symptoms.  Patient like to stay at 6 hours 5 days a week.  Long discussion was held with the patient about concussions and treatment plan, patient agrees that he needs to take his breaks and listen to his brain.    Plan:     Increase Wellbutrin 300 mg    Neuropsychological assessment completed    Yes, he exhibited borderline to mildly impaired verbal memory and low average visual memory abilities.  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  Mr. Good is expected to continue to improve with regard to his cognition and physical symptoms in coming weeks; however, we will need to track his cognitive recovery over time.  At this point, there is no evidence of a clinically significant adverse emotional reaction to his injury.      Pain control for headaches - Tylenol only due to rebound headaches, Kiley/blue tinted glasses  Currently doing PT  Yes   Completed No   Currently doing OT  No   Completed No    Currently doing ST   No   Completed No   Psychology  No    MRI    Completed Yes, head CT was reportedly negative for acute intracranial pathology.   Labs Completed Yes      Any new medication (other provider):   No   Meds started at last appointment  Yes   Meds increased at last appointment    No     Sleeping Problems-        Currently on medication  Yes   How much taking  Amitriptyline         New med  No   Increased/lowered  No, amitriptyline  Anxiety due to concussion -        Currently on medication  Yes   How much taking Wellbutrin 150 mg       New med  No    Increased/lowered  Yes  Decreased concentration and focus -       Currently on medication Yes   How much taking Wellbutrin 150 mg      New med  No    Increased/lowered  Yes, increased to 300 mg  Work note written today   Yes       Subjective:          HPI    He is a 54 y.o. male who initially presented to the concussion clinic on 4/12/19 with a blunt/closed head injury which he sustained while at work on 3/11/19.  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 He hit his head the ball of the trailer hitch, and then maybe again on the ground. He went inside and he attempted to return to work immediately after his fall. However, within 15 minutes of working on his computer he developed a headache, nausea and dizziness.  He was seen in the ER, where  a head CT was negative and he was diagnosed with a concussion and encouraged to rest. After taking two weeks off of work, he attempted to return to full-time work and experienced an exacerbation of his symptoms.        Date of accident :                                                           Workman's Comp   Yes   [facility]   No, no problems at this time    Headaches:  Significant ongoing headaches Yes  Headaches: Intermittently and Daily  Improvement :Yes   Current Headache Yes   Wake with HA sometimes      Worse Headache    3/10           How often: Couple times a week  Average Headache 2/10.    Best Headache 0/10.  Brings on HA:   Overdoing it  Makes symptoms worse conference calls  Makes symptoms better.  Rest and breaks  Taking patient reports he is not taking any over-the-counter medications          Physical Symptoms:  Headache-Yes             Improved since last visit  Yes   Nausea-No              Vomiting -No             Balance problems - Yes       Improved since last visit    Yes           Dizziness - Yes         Improved since last visit    Yes          Visual problems - No            Fatigue - Yes           Improved since last visit  Yes           Sensitivity to light - Yes        Improved since last visit    Yes          Sensitivity to sound - Yes           Improved since last visit  Yes          Numbness/tingling - No          Cognitive Symptoms  Feeling mentally foggy -Yes          Improved since last visit   Yes         Feeling slowed down -Yes           Improved since last visit  Yes         Difficulty Concentrating- Yes           Improved since last visit  Yes           Difficulty remembering - Yes         Improved since last visit  Yes           Emotional Symptoms  Irritability - Yes            Improved since last visit Yes           Sadness-  No         More emotional - No             Nervousness/anxiety -Yes        Improved since last visit Yes           Psychiatric History:  Anxiety -  No  Depression - No  Sleep Disorders - No  Any thought of hurting self or others now?   No  Any history of hurting self or others?            No    Sleep History:  Drowsiness- Yes           Improved since last visit  Yes            Sleep less than usual - No  Sleep more than usual - No  Trouble falling asleep - No       Improved since last visit  Yes            Does the patient wake feeling rested - Yes         Improved since last visit Yes           Migraine Headaches      Patient history of migraines.    Yes      Exertion:         Do the above stated symptoms worsen with physical activity? Yes           Improved since last visit  Yes                  Do the above stated symptoms worsen with cognitive activity? Yes         Improved since last visit  Yes                  Work/School        Do the above stated symptoms worsen with school/work?        Yes        Have your returned to work/school? Yes  Hours a day  6   Days a week  5            Number of Days that you needed to leave or miss     0               There are no active problems to display for this patient.    No past medical history on file.  No past surgical history on file.  No family history on file.  Current Outpatient Medications   Medication Sig Dispense Refill     amitriptyline (ELAVIL) 25 MG tablet Take 1 tablet (25 mg total) by mouth at bedtime. 60 tablet 2     buPROPion (WELLBUTRIN XL) 150 MG 24 hr tablet Take 1 tablet (150 mg total) by mouth daily. 30 tablet 2     ibuprofen (ADVIL,MOTRIN) 600 MG tablet Take 600 mg by mouth.       losartan (COZAAR) 100 MG tablet   0     octreotide (SANDOSTATIN LAR DEPOT) 30 mg IM injection Inject 30 mg into the shoulder, thigh, or buttocks.       octreotide (SANDOSTATIN) 100 mcg/mL Soln Inject 100 mcg under the skin.       ondansetron (ZOFRAN ODT) 4 MG disintegrating tablet Take 1 tablet (4 mg total) by mouth every 8 (eight) hours as needed for nausea. 10 tablet 0     promethazine (PHENERGAN) 25 MG tablet   0      SUMAtriptan (IMITREX) 100 MG tablet Take 100 mg by mouth.       SUMAtriptan (IMITREX) 100 MG tablet   0     SUMAtriptan (IMITREX) 50 MG tablet   3     urea (CARMOL) 40 % Crea Apply to affected area daily.       No current facility-administered medications for this encounter.        Allergies   Allergen Reactions     Flagyl [Metronidazole]      Social History     Socioeconomic History     Marital status:      Spouse name: Not on file     Number of children: Not on file     Years of education: Not on file     Highest education level: Not on file   Occupational History     Not on file   Social Needs     Financial resource strain: Not on file     Food insecurity:     Worry: Not on file     Inability: Not on file     Transportation needs:     Medical: Not on file     Non-medical: Not on file   Tobacco Use     Smoking status: Not on file   Substance and Sexual Activity     Alcohol use: Not on file     Drug use: Not on file     Sexual activity: Not on file   Lifestyle     Physical activity:     Days per week: Not on file     Minutes per session: Not on file     Stress: Not on file   Relationships     Social connections:     Talks on phone: Not on file     Gets together: Not on file     Attends Anabaptism service: Not on file     Active member of club or organization: Not on file     Attends meetings of clubs or organizations: Not on file     Relationship status: Not on file     Intimate partner violence:     Fear of current or ex partner: Not on file     Emotionally abused: Not on file     Physically abused: Not on file     Forced sexual activity: Not on file   Other Topics Concern     Not on file   Social History Narrative     Not on file       The following portions of the patient's history were reviewed and updated as appropriate: allergies, current medications, past family history, past medical history, past social history, past surgical history and problem list.    Review of Systems  A comprehensive review of  systems was negative except for: What is noted above    Objective:       Discussion was held with the patient today regarding concussion in general including types of injury, symptoms that are common, treatment and variability in time to recover. I also provided written information about concussion and symptoms that would apply to him in his concussion folder. Education about concussion symptoms and length of time it would take the patient to recover was also given to the patient.  I have reassured the patient his symptoms are very common when a concussion is present and will improve with time. We discussed the risks and benefits of the medication including risk of worsening depression with medication adjustments and even the possibility of emergence of suicidal ideations.       Total time spent with the patient today was 30 minutes with greater than 50% of the time spent in counseling and care coordination. The patient will return in about 4 weeks to this clinic for further assessment and treatment. He agrees to call before then with any questions, concerns or problems. We will assess for the appropriateness of possible psychotropic medication trials/changes. The patient will seek out appropriate emergency services should that become necessary.I will be available if any questions, concerns, or problems that arise.     Mental Status Examination  Patient is casually dressed and seated for evaluation. He is cooperative with questioning and eye contact is good. He is fully engaged in conversation today. He is alert and fully oriented. Speech is normal. Thought processes normal with normal prehension and expression. Thoughts are organized and linear. Content is pertinent to the conversation and without evidence of auditory or visual hallucinations. No delusional ideation. Affect/mood is euthymic-bright, even. Gen. fund of knowledge, insight and memory are normal.

## 2021-05-28 NOTE — PROGRESS NOTES
After speaking with the patient and his referring nurse practitioner, speech and language therapy services were felt to be needed. Unfortunately I was unable to enter those orders retrospectively in Epic, the request for an order for outpatient SLP will be sent to Mavis Ross CNP.

## 2021-05-29 NOTE — PROGRESS NOTES
"Physical Therapy  Physical Therapy Daily Outpatient Documentation   DISCHARGE SUMMARY                              Rx Units: 2- NR                        Total OP Minutes: 24  Treatment #: 7/8     Pain: 0/10, 0/10, 4/10  Location: headache, \"dizzy\", R hip  Intervention: see below     Subjective: Patient went to [place] for an extended time.  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 He has had headaches 2-3x/week and developed more frequent headaches during work and screen time.  Average headache intensity 4-5/10 and lasts a couple of hours in duration.  In terms of his dizziness, his last bout was about 1 week ago when he was bending down a lot.  Overall his balance has been improving as he was able to walk on a narrow beam while carrying wood without falling.  He continues to experience path deviations when walking up a ramp and performing head turns while looking out windows. Patient reports compliance to his HEP.   Since last session, he has developed R hip pain and is scheduled to have an MRI for management.         Therapeutic Exercise  Total Minutes:     Neuro Re-Ed/Balance  Total Minutes: 24     Functional Gait Assessment:  Total Score: 30/30 (<24/30 = fall risk)   Gait Level surface: 3 - Normal 4.4 seconds   Change in Gait Speed: 3 - Normal   Gait with horizontal head turns: 3 - Normal (<6\" path deviation to R)   Gait with vertical head turns:  3 - Normal (<6\" path deviation to R)   Gait and Pivot Turn: 3 - Normal   Step Over Obstacle: 3 - Normal   Gait with Narrow Base of Support: 3 - Normal   Gait with Eyes Closed: 3 - Normal 5.4 seconds (<6\" path deviation to R during last 3 feet)   Ambulating Backwards: 3- Normal 5.71 seconds   Steps: 3 - Normal  Pt educated on score and fall risk implications  All questions addressed.       Pt educated on progression of HEP and areas to focus on in order to continue self managing lingering symptoms:   -reducing size of target with gaze stab " "exercises  -busy or moving backgrounds  -dynamic walking with gaze stab  -tandem stance with PNF patterns and head turns,  -rocker board PNF pattern with head turns, gaze stab on board  -walking program with head turns  -All questions addressed.            HEP:  -gaze stabilization on busy background, with walking, tandem, on balance board, 45 degree head turn hold, next to TV  -semi-sharpened romberg standing balance activities on unstable surface: head turns, PNF, arm movements   -dynamic gait activities: head turns, backwards, turns, quick change in directions, tandem  -habituation exercises: reaching for foot in seated and standing, 360 degree turns.   -saccades - next to TV: horizontal and diagonal targets     Assessment/Plan for week of 6/17/2019-6/21/2019:  Patient has been seen for 7 PT sessions from 4/10/2019 to 6/19/2019 for management of dizziness, balance impairments and headache after sustaining a concussion at work when he fell. Patient presented to PT with balance issues and scored high fall risk on the Functional Gait Assessment (22/30) on initial evaluation. His balance has greatly improved and he now scores 30/30 on the Functional Gait Assessment.  He continues to demonstrated mild path deviations <6\" to the right with head turns and eyes closed, but is able to self correct. He has returned to water skiing, however has not been competing as he becomes fatigued with a significant amount of physical activity. Patient has noticed mild balance issues when he completes high level balance activities and multitasking and at baseline it is assumed that he was functioning at a very high level given his hobbies including slalom water skiing.  He has been able to tolerate heavy labor without symptoms while on vacation, however continues to have headaches with screens.  Overall his dizziness has significantly reduced and in the past week has only experienced symptoms with repetitive bending.  He is currently " independent with a HEP to self manage lingering symptoms.  Patient will be discharged from skilled PT at this time; 3/4 PT goals have been met.  Patient is in agreement with plan.         Functional Goals:  to be met by 8 visits  -Pt will report decreased intensity of headache pain to 2/10 at worst - NOT MET - provoked by screens.   -Pt will report decreased intensity of dizziness symptoms to 1/10 at worst - MET  -Pt will score >/= 28/30 on FGA to demonstrate improved balance in order to return to high level activities   - MET  -Pt will be independent with HEP and home management of symptoms - MET

## 2021-05-29 NOTE — PROGRESS NOTES
"BRIEF NEUROPSYCHOLOGICAL CONSULTATION    NAME: Dixon Good  YOB: 1965     DATE OF EVALUATION: 6/17/2019      REASON FOR REFERRAL & HISTORY OF PRESENT ILLNESS:  Mr. Dixon Good is a 54 y.o., right-handed,  male who presents to the [facility] for further evaluation and management of a concussion injury he sustained on 3/11/19.  He was referred for neuropsychological consultation by Mavis Ross CNP to provide information regarding cognitive and emotional functioning following his mild brain injury.  Mr. Good was originally seen by me on 4/15/19 and the reader is referred to my documentation from that date for a detailed review of his history and the details of his current injury.  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 Good was expected to continue to improve with regard to his cognition and physical symptoms in coming weeks but was encouraged to modify his workload and daily schedule (to include a shortened work day) as well as additional accommodations (i.e., working from a private office, using only one computer screen at a time, taking regular rest breaks, etc.).  I also encouraged a course of cognitive rehabilitation and consultation with an audiologist, which did not occur in the interim, and repeat neuropsychological testing to track his progress over time.    CLINICAL INTERVIEW:  Physical symptoms: Improving.  Mr. Good reports that his physical symptoms have improved significantly over the course of the last two months, but that he continues to be primarily bothered by dizziness (positional) and mental fatigue and light sensitivity when using the computer for prolonged periods of time. He noted that if he takes breaks, sticks to his 6 hours/day schedule, and limits " "screen time, his symptoms are much milder and he is not prone to headaches. His tinnitus has improved greatly and is \"almost gone,\" but does seem to flare-up somewhat in times of stress. His sleep remains an issue, as he has recently flared-up longstanding back/hip injuries and currently awakens every 45 minutes throughout the night due to pain. He estimates that he is currently getting only 4-5 hours of sleep per night.    Cognitive Symptoms & ADLs: Improving.  From a cognitive perspective, Mr. Good does not feel that his memory difficulties have improved. He has developed good compensatory strategies, including using cheat sheets, writing things down, and checking and re-checking things, which has been helpful. He feels that his multi-tasking, reasoning, and problem solving skills have improved and provided an example of helping a friend with the planning of a large construction project.  He denied any performance issues at work, but noted that he is no longer the . 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 He is committed to his job and employer but stated that he has been getting some \"grief\" about his work accommodations and he is uncertain of his future/their willingness to continue to accommodate him.    Mood Symptoms: None reported. Despite his chronic pain, gastrointestinal issues, work restrictions, and post-concussive symptoms, Mr. Good continues to report that he is in good spirits. He continues to deny a tendency to worry. He has a number of avocational interests and good social support and denies being bothered by his inability to return to his previously enjoyed physical activities (i.e., competitive waterskiing, etc.).    SERVICES:   A clinical interview/neurobehavioral status examination was conducted with the patient and documented. I thoroughly reviewed the medical record, selected the neuropsychological test battery, provided " supervision to the trained examiner/technician, interpreted/integrated patient data and test results, engaged in clinical decision making, treatment planning and and report writing/preparation. A predoctoral intern in psychology administered and scored the  neuropsychological tests (2 + tests).  Please see below for a breakdown of time spent and the associated codes billed for these services.  Services   Time Spent  CPT Codes   Neurobehavioral Status Exam:  (e.g., face-to-face, interpretation, report)   32 minutes 1 x 96116     Neuropsychological Evaluation Services:   (e.g., integration, interpretation, report preparation, treatment planning, clinical decision making, feedback)   60 minutes   1 x 96132   Neuropsychological Testing by Psychology Trainee (postdoctoral intern):  (e.g., test administration, scoring, 2+ tests administered)   51 minutes   1 x 96136  1 x 96137   For diagnostic and coding purposes, Mr. Good has a history of mild traumatic brain injury and mild neurocognitive disorder due to TBI.    TEST RESULTS:  Auditory attention and working memory performances fell in the high average range of functioning.  Specifically, he was able to immediately recall up to 8 digits presented auditorily, mentally reverse up to 5 digits (average), and numerically sequence up to 6 digits (average).    Comprehension appeared fully intact.  Confrontation naming was average.  His performance on a measure of semantic verbal fluency fell in the above average range of functioning overall.     Cognitive speed and processing accuracy fell in the above average range of functioning on a task that required him to use numbers to rapidly decode a series of abstract symbols.  His performance fell in the high average range on a task that required him to quickly connect a series of numbers presented in a visual array on a page. His performance was in the average range on a subsequent task that required mental flexibility and  set-shifting to quickly alternate between sequencing letters and numbers presented on a page.    Visual attention and spatial localization skills were average.  Two dimensional visuoconstruction of a geometric design was notable for speed and a bit of carelessness and his performance fell in the slightly below expectation as a result range relative to peers.      With regard to learning and memory, Mr. Good was also administered measure of rote auditory verbal list learning that required him to learn a series of 10 words over 4 trials and retain and recall them over a long (20 minute) delay.  His initial rate of learning fell in the superior range of functioning (raw score recall over trials = 7, 9, 10, 10).  Mr. Good retained and recalled approximately 40% of the previously learned information over the long delay (average performance).  Recognition memory was fully intact.  Contextual auditory verbal learning abilities were superior and he retained and recalled 100% of the previously learned information over a delay (superior). Visual delayed memory was average, with 88% retention.      On the Patient Health Questionnaire-9, a self report measure of depressive symptomatology, he obtained a score of 7, placing him in the range of mild depression.  He denied suicidal ideation.    On the Generalized Anxiety Disorder-7, a self-report measure of anxiety, he obtained a score of 4,  placing him in the range of minimal anxiety.     SUMMARY, REPORT, PLAN:  A brief neurocognitive screener was conducted and Mr. Good was a cooperative and motivated participant.  A comparison of his performances today with those obtained on 4/15/19 reveals consistent improvements in basic cognitive speed, spatial perception, expressive language abilities, and verbal and visual learning and memory.  Although one of his verbal memory performances remains slightly below expectation (i.e., delayed list recall was at the low end of the  "average range), significant improvement was observed in his learning efficiency and contextual verbal and visual memory (and all performances were broadly average today). Slight variability in his profile may be explained by a recent flare-up of pain after he recently \"over-did it\" physically and exacerbated longstanding injuries to his hip and back. In addition, his sleep is increasingly disrupted and restless secondary to pain (awakens every 45 minutes throughout the night due to pain, averaging 4-5 hours/night).      At the end of the session I met with Mr. Good and reviewed these results and provided education. He was pleased to learn about the improvements observed in his neuropsychological profile but continues to feel that his memory is somewhat \"spotty.\"  We discussed compensatory strategies to optimize his functioning in daily life and how to be more deliberate about going through the steps to ensure that he is getting all of the relevant information into his [place] calendar.  We discussed sleep hygiene and Mr. Good's preference to avoid medications and I talked with him about how appropriate treatment for his physical pain may help to improve his sleep, and thereby help him turn the corner in his recovery from his head injury.  At the end of the session, I renewed his work letter until he can be seen by our nurse practitioner and encouraged him to continue to increase his hours by 15 minute intervals, gradually in coming weeks.     Thank you for this appropriate referral. No further follow-up in neuropsychology is needed.    Please contact me with any questions regarding the content of this note.      Hellen Jacques, PhD, LP, ABPP  Board Certified in Clinical [place] [address]  Phone: [phone]      "

## 2021-05-29 NOTE — PROGRESS NOTES
"Physical Therapy  Physical Therapy Daily Outpatient Documentation        Rx Units: 4 - NR    Total OP Minutes: 55  Treatment #: 5/8    Pain: 3/10, 0/10  Location: headache, \"dizzy\"   Intervention: see below    Subjective: Patient reports that he went slalom water skiing since last session and fell making the first turn then was able to make 5 passes through the course without a fall.  He notes making the turns was more challenging than normal. Overall his dizziness has been improving. He is having difficulty/dizziness issues with bending over and returning to standing. This was noticeable when he was picking up stick. He has also been somewhat cautious with this movement when he is at work.  He moved back to his normal office and is now working 6-7 hours. He has been working in the shop however has difficulty with loud noises.   He headaches have been getting to 6/10 at worst.  Headache frequency every few days and with average intensity of 3-4/10.   Overall pt notes dizziness is dependent on activity and having multiple distractions.  Pt feels curently 60% back to baseline       Therapeutic Exercise  Total Minutes:    Neuro Re-Ed/Balance  Total Minutes: 55  Dynamic balance and vestibular treatment in order to address dizziness and balance control:  Treadmill Training - no UE support, 15 minutes total   -2.5 mph x 2 minutes - warm up   -2.5 mph x 1 minute with head turns left/right   Dizziness 1/10 - R path deviations with L head turns  -2.5 mph x 1 minute with head turns up/down  Dizziness 0/10   Very mild path deviations to R. Minor VC for abdominal engagement   -2.5 mph x 1 minute with head turns left/right   Dizziness 1/10 - mild path deviations, VC for abdominal engagement and occasional cross over steps for recovery of balance  -2.5 mph x 1 minute with head turns diagonal (L/R)  Dizziness 1/10 - mild path deviations intermittently with pt able to self manage with abdominal engagement      -2.5 mph x 1 minute " with head turns diagonal (R/L)   Dizziness 3/10 - R path deviations with looking down to left. Occasional crossover step for recovery of balance  -2.5 mph x 1 minute with head turns in all directions  Dizziness 0/10   Intermittent path deviations to the right     - 1 minute rest breaks of walking without head turns with manage dizziness.     In order to adapt the vestibular system and reduced dizziness symptoms to improve balance and fall risk:  -Walking and catching and toss 2# ball to therapist behind pt with turning in diagonal pattern x 120' x 3 reps each direction of rotation - VC for increased rotation to the left and catch and toss ball with two hands for increased rotation.  -Gaze stabilization x1 with medium size target about arms length away on busy background while walking down the hallway:   Horizontal head turns: x 70' x 3 reps - cues for continued head turns    Vertical head turns: x 70' x 2 reps - cues for abdominal engagement. Pt demonstrated lateral path deviations to R  -Gaze stabilization x1 with medium size target about arms length away while walking down the hallway:   Horizontal head turns: x 70' x 3 reps - good head turns. Added cognitive task of naming animals in alphabetical order. Difficulty attending to head turns and cognitive task   Vertical head turns: x 70' x 2 reps - no path deviations.  -Gaze stabilization x1 with medium size target about arms length away on busy background, romberg stance:   Diagonal head turns: 25 seconds, 30 seconds - cues for direction of head turns/coordination of movement       Standing balance activities on rocker board, semi-sharpened romberg stance:  -static hold x 30 seconds - increased ankle strategies  -perturbations to trunk x 30 seconds with each foot forward - no LOB, moderate sway  -perturbations to board x 30 seconds with each foot forward - no LOB, mild to moderate sway at times with R arm out for balance at times  -Horizontal head turns x 10 reps -  mild postural sway  -Vertical head turns x 10 reps - increased ankle strategies, no LOB  -Gaze and head turn to 45 degrees sustained x 30 seconds each direction x 2 reps - no LOB, second rep with mild perturbations to trunk, mild postural sway  -pt holding ski tow rope and therapist providing external perturbations to diagonal direction alternating sides with slack then taut rope to mimic water skiing x 3 minutes x 3 reps - 1 LOB with L foot forward; 5 LOB with R foot forward.      Habituation exercises to increase tolerance to movement and reduce fall risk:   -360 degree turns against the wall x 3 reps in row to R - dizziness 3/10 post and 2 minutes for recovery;   X 3 reps in a row to L  - dizziness 5/10 post with 4 minute recovery.         HEP:  -gaze stabilization on busy background, with walking, tandem  -semi-sharpened romberg standing balance activities on unstable surface: head turns, PNF, arm movements   -dynamic gait activities: head turns, backwards, turns, quick change in directions, tandem  -habituation exercises: reaching for foot in seated and standing, 360 degree turns.     Assessment/Plan for week of 5/20/2019-5/24/2019:  Patient continues to be motivated to address balance and dizziness deficits in order to optimize function and return to prior level of function. He was able to tolerate dynamic balance activities on the treadmill at a faster speed, however continue to be challenged by horizontal and diagonal head turns.  This was also evident when completing tasks that required head and body rotation on the firm ground and coordination catching an object.  He is challenged by narrow ANAI especially on uneven surfaces and external perturbations which are required when he water skis. Patient continues to experience dizziness when bending over which he is required to do at his job.  When he experiences this dizziness, he requires a couple minutes for recovery.  He has been able to tolerate advancement  of gaze stabilization and vestibular adaptation.  Patient continues to benefit from skilled PT to address dizziness and optimize function in order to return to his job safely and recreational activities. Plan to complete additional bending tasks to increase tolerance, progress VORx2 exercise and high level balance activities.       Functional Goals:  to be met by 8 visits  -Pt will report decreased intensity of headache pain to 2/10 at worst - progressing  -Pt will report decreased intensity of dizziness symptoms to 1/10 at worst - progressing  -Pt will score >/= 28/30 on FGA to demonstrate improved balance in order to return to high level activities    -Pt will be independent with HEP and home management of symptoms

## 2021-05-29 NOTE — PROGRESS NOTES
"Physical Therapy  Physical Therapy Daily Outpatient Documentation        Rx Units: 3 - NR    Total OP Minutes: 43  Treatment #: 6/8    Pain: 0/10, 3/10  Location: headache, \"dizzy\"   Intervention: see below    Subjective: Patient continues to report improvements with his symptoms and has been able to tolerate with noise and activity in the shop better.  He continues to have difficulty with divided attention and screen time, which are required for his job.  Patient has been experiencing headaches about 4-5x/wk at work and it is typically provoked by screens.  He wakes up with a headache 3x/wk. The most severe headache has been rated 6/10 in the past week.  Overall his dizziness has improved but he continues to be provoked by screens at work and skiing activities.  He has been able slalom water ski a number of times since last session with improved balance.  In terms of his balance, he mainly notices balance issues and dizziness with standing up from a squat or kneeling position.       Therapeutic Exercise  Total Minutes:    Neuro Re-Ed/Balance  Total Minutes: 40    Oculomotor assessment with infrared goggles:   Spontaneous nystagmus: negative   Gaze evoked nystagmus: mild left beating nystagmus with left gaze   Pressure nystagmus: negative   Head shake nystagmus: 3-4 beats of left beating nystagmus    Dynamic balance and vestibular treatment in order to address dizziness and balance control:  Treadmill Training - no UE support, 12 minutes total   -2.5-2.8 mph x 2 minutes - warm up   -2.8 mph x 1 minute with head turns diagonal (L/R)   Dizziness 3/10 - path deviations and narrow ANAI with looking down.   -2.8 mph x 1 minute with head turns diagonal (R/L)  Dizziness 3/10   Path deviations to R with right head turns.  -2.8 mph x 1 minute with head turns left/right and cognitive task  Dizziness 4/10 - path deviations with right head turns. Pt able to self correct  -2.8 mph x 1 minute with head turns up/down and cognitive " task  Dizziness 1/10 - no path deviationis    -2.8 mph x 1 minute with head turns in all directions  Dizziness 2/10   Intermittent path deviations to the right   - 1 minute rest breaks of walking without head turns with manage dizziness.     In order to adapt the vestibular system and reduced dizziness symptoms to improve balance and fall risk:  Gaze stabilization x1 with medium size target about 5' away on blank background, normal ANAI on bosu ball (flat side up):   Horizontal head turns: 120 seconds with perturbations to bosu ball surface - cues to maintain speed of head turn   Gaze stabilization x 1 with medium size target at 45 degree angle from patient x 20 seconds then turning head to find target at 45 degree angle on opposite side x 20 seconds while standing on bosu ball (flat side up) - pt able to complete 2 reps each side before blurry vision      In order to adapt the vestibular system and reduced dizziness symptoms to improve balance and fall risk:  Standing balance activities on bosu ball (flat side up) normal stance, SBA:  -static hold x 60 seconds - increased ankle strategies  -horizontal head turns x 10 reps - no LOB  -perturbations to board x 60 seconds x 2 sets - no LOB, increased ankle strategies and arms in high guard at times.   -head turns with perturbations to board x 60 seconds - no LOB, however moderate sway and high guard with arms.   Pt instructed in saccades with busy/moving environment next to him - 2 targets horizontally and 2 targets diagonally to tolerance. Pt instructed to attempt to use horizontally moving environment on large screen as able  Pt instructed to advance VOR x1 standing next to moving environment/large screen as tolerated.  All questions addressed    Habituation exercises to increase tolerance to movement and reduce fall risk:   -Forward flexion to foot x 2 reps each -       Other:  3 minutes:  Patient educated on positional testing technique and purpose of recreating  symptoms - patient consented. Vestibular semi-circular canal assessment with infrared goggle, fixation removed:   Left Irvona Hallpike: negative  Right Rj Hallpike: negative  Left Supine Roll Test (HC): negative  Right Supine Roll Test (HC): negative  Patient educated on negative findings.        HEP:  -gaze stabilization on busy background, with walking, tandem, on balance board, 45 degree head turn hold, next to TV  -semi-sharpened romberg standing balance activities on unstable surface: head turns, PNF, arm movements   -dynamic gait activities: head turns, backwards, turns, quick change in directions, tandem  -habituation exercises: reaching for foot in seated and standing, 360 degree turns.   -saccades - next to TV: horizontal and diagonal targets    Assessment/Plan for week of 6/3/2019-6/7/2019:  Patient continues to be motivated to address dizziness, balance and headaches in order to optimize his function after sustaining a concussion at work. He demonstrated improved balance on unstable surfaces with and without perturbations.  His tolerance to gaze stabilization exercises for vestibular adaptation is improving and exercise/conditions were further advanced.  Patient continues to be challenged with diagonal head turns and divided attention activities.  Plan to follow up with patient in 2 weeks to assess response to HEP progression and tolerance to increased stimuli. Continued PT indicated to fully optimize function and return to work.  Current POC and goals remain appropriate for pt at this time.       Functional Goals:  to be met by 8 visits  -Pt will report decreased intensity of headache pain to 2/10 at worst - progressing  -Pt will report decreased intensity of dizziness symptoms to 1/10 at worst - progressing  -Pt will score >/= 28/30 on FGA to demonstrate improved balance in order to return to high level activities    -Pt will be independent with HEP and home management of symptoms

## 2021-05-30 NOTE — PROGRESS NOTES
"Assessment:     1. Post Concussion Syndrome  2. Post Concussion Headache  3. Anxiety d/t concussion    Discussed: RED FLAGS NEEDING ACUTE EMERGENCY MANAGEMENT:                          Headaches that worsen                          Seizures                          Focal Neurologic Signs                          Drowsiness/Lethargy                          Repeated vomiting                          Slurred Speech                          Inability to recognize people/places                          Increasing confusion/irritability                          Weakness/numbness                          Neck pain                          Unusual behavioral change                          Change in level of consciousness    The patient returns to the concussion clinic for a follow up visit, he was last seen by me on 4/30/19, where no medication changes were made, the patient had just started taking amitriptyline.  The patient reports that he is sleeping much better and his physical symptoms, headaches, have gotten much better.  Reports that he continues to have ringing in his ears, but reports this is only when he is working, the ringing in the years worsens the more he attempts to push through his symptoms.  He reports that he was waterskiing this past weekend and reports he was \"overstimulated\" and done.  Long discussion was held with the patient will concussions and treatment plans.  Given the patient's chronic pain, ringing in the ears and feeling overstimulated may be his brains way of telling him to \"take it easy\".  The patient is back to working full-time, which is between 45 to 55 hours a week.  Overall patient is reporting improvement in his physical, cognitive, and emotional concussive symptoms.  No changes be made at this time patient will monitor symptoms and return in 2 months      Plan:     Ordered today: No medication changes    Neuropsychological assessment completed    Yes   Pain control for headaches - " Tylenol only due to rebound headaches, Kiley/blue tinted glasses  Currently doing PT  No   Completed Yes   Currently doing OT  No   Completed No    Currently doing ST   No   Completed No   Psychology  No    MRI  Completed Yes   Labs   completed Yes      Any new medication (other provider):   No   Meds started at last appointment    Meds increased at last appointment    No     Sleeping Problems-        Currently on medication  Yes    amitriptyline 25- 50 mg        New med  No   Increased/lowered  No  Anxiety due to concussion -        Currently on medication  No          New med  No    Increased/lowered  No  Decreased concentration and focus -       Currently on medication No         New med  No    Increased/lowered  No  Work note written today   No       Subjective:          HPI    He is a 54 y.o. male who initially presented to the concussion clinic on 4/12/19 with a blunt/closed head injury which he sustained while at work on 3/11/19.  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 He hit his head the ball of the trailer hitch, and then maybe again on the ground. He went inside and he attempted to return to work immediately after his fall. However, within 15 minutes of working on his computer he developed a headache, nausea and dizziness.  He was seen in the ER, where a head CT was negative and he was diagnosed with a concussion and encouraged to rest. After taking two weeks off of work, he attempted to return to full-time work and experienced an exacerbation of his symptoms.    Date of accident : 3/11/2019                                                       Workman's Comp   Yes   [facility]   No       Headaches:  Significant ongoing headaches Yes  Headaches: Intermittently, maybe once a week  Improvement :Yes   Current Headache No   Wake with HA  Yes, not a concussion headache, they are more sinus related    Worse  Headache    3/10           How often: when he has a HA    Average Headache 3/10.    Best Headache 0/10.  Brings on HA:   work  Makes symptoms worse  work  Makes symptoms better. rest  Taking  acetaminophen (Tylenol)        Helpful:  Yes       Physical Symptoms:  Headache-Yes       Since last visit  Improved     Nausea-No           Balance problems - Yes     Since last visit  Improved      Dizziness - Yes          Since last visit  Improved     Visual problems - Yes    Since last visit  Same     Fatigue - No             Since last visit  Improved     Sensitivity to light - No       Sensitivity to sound - Yes         Since last visit  Improved     Numbness/tingling - Yes     Since last visit  Same         Cognitive Symptoms  Feeling mentally foggy -No        Feeling slowed down -No           Difficulty Concentrating- No      Difficulty remembering - Yes        Since last visit  Same       Emotional Symptoms  Irritability - No         Sadness-  No         More emotional - No       Nervousness/anxiety -No        Psychiatric History:  Anxiety - No  Depression - No  Sleep Disorders - No  Any thought of hurting self or others now?   No  Any history of hurting self or others?            No    Sleep History:  Drowsiness- No       Sleep less than usual - No  Sleep more than usual - No  Trouble falling asleep - No     Does the patient wake feeling rested - Yes          Migraine Headaches      Patient history of migraines.    Yes      Exertion:         Do the above stated symptoms worsen with physical activity? Yes       Since last visit  Same           Do the above stated symptoms worsen with cognitive activity? Yes      Since last visit  Same            Work/School        Do the above stated symptoms worsen with school/work?        Yes        Have your returned to work/school? Yes 45-55 hours        Number of Days that you needed to leave or miss     0           There are no active problems to display for this patient.    No  past medical history on file.  No past surgical history on file.  No family history on file.  Current Outpatient Medications   Medication Sig Dispense Refill     amitriptyline (ELAVIL) 25 MG tablet Take 1 tablet (25 mg total) by mouth at bedtime. 60 tablet 2     buPROPion (WELLBUTRIN XL) 150 MG 24 hr tablet Take 1 tablet (150 mg total) by mouth daily. 30 tablet 2     ibuprofen (ADVIL,MOTRIN) 600 MG tablet Take 600 mg by mouth.       losartan (COZAAR) 100 MG tablet   0     octreotide (SANDOSTATIN LAR DEPOT) 30 mg IM injection Inject 30 mg into the shoulder, thigh, or buttocks.       octreotide (SANDOSTATIN) 100 mcg/mL Soln Inject 100 mcg under the skin.       ondansetron (ZOFRAN ODT) 4 MG disintegrating tablet Take 1 tablet (4 mg total) by mouth every 8 (eight) hours as needed for nausea. 10 tablet 0     promethazine (PHENERGAN) 25 MG tablet   0     SUMAtriptan (IMITREX) 100 MG tablet Take 100 mg by mouth.       SUMAtriptan (IMITREX) 100 MG tablet   0     SUMAtriptan (IMITREX) 50 MG tablet   3     urea (CARMOL) 40 % Crea Apply to affected area daily.       No current facility-administered medications for this encounter.        Allergies   Allergen Reactions     Flagyl [Metronidazole]      Social History     Socioeconomic History     Marital status:      Spouse name: Not on file     Number of children: Not on file     Years of education: Not on file     Highest education level: Not on file   Occupational History     Not on file   Social Needs     Financial resource strain: Not on file     Food insecurity:     Worry: Not on file     Inability: Not on file     Transportation needs:     Medical: Not on file     Non-medical: Not on file   Tobacco Use     Smoking status: Not on file   Substance and Sexual Activity     Alcohol use: Not on file     Drug use: Not on file     Sexual activity: Not on file   Lifestyle     Physical activity:     Days per week: Not on file     Minutes per session: Not on file     Stress:  Not on file   Relationships     Social connections:     Talks on phone: Not on file     Gets together: Not on file     Attends Evangelical service: Not on file     Active member of club or organization: Not on file     Attends meetings of clubs or organizations: Not on file     Relationship status: Not on file     Intimate partner violence:     Fear of current or ex partner: Not on file     Emotionally abused: Not on file     Physically abused: Not on file     Forced sexual activity: Not on file   Other Topics Concern     Not on file   Social History Narrative     Not on file       The following portions of the patient's history were reviewed and updated as appropriate: allergies, current medications, past family history, past medical history, past social history, past surgical history and problem list.    Review of Systems  A comprehensive review of systems was negative except for: What is noted above    Objective:       Discussion was held with the patient today regarding concussion in general including types of injury, symptoms that are common, treatment and variability in time to recover. I also provided written information about concussion and symptoms that would apply to him in his concussion folder. Education about concussion symptoms and length of time it would take the patient to recover was also given to the patient.  I have reassured the patient his symptoms are very common when a concussion is present and will improve with time. We discussed the risks and benefits of the medication including risk of worsening depression with medication adjustments and even the possibility of emergence of suicidal ideations.       Total time spent with the patient today was 30 minutes with greater than 50% of the time spent in counseling and care coordination. The patient will return in about 8 weeks to this clinic for further assessment and treatment. He agrees to call before then with any questions, concerns or problems.  We will assess for the appropriateness of possible psychotropic medication trials/changes. The patient will seek out appropriate emergency services should that become necessary.I will be available if any questions, concerns, or problems that arise.     Mental Status Examination  Patient is casually dressed and seated for evaluation. He is cooperative with questioning and eye contact is good. He is fully engaged in conversation today. He is alert and fully oriented. Speech is normal. Thought processes normal with normal prehension and expression. Thoughts are organized and linear. Content is pertinent to the conversation and without evidence of auditory or visual hallucinations. No delusional ideation. Affect/mood is euthymic-bright, even. Gen. fund of knowledge, insight and memory are normal.

## 2021-05-30 NOTE — PROGRESS NOTES
Pt being seen for f/u concussion apt.  Pt states that he has been doing well and tolerating medications well.  Sleeping well at night.

## 2021-06-01 NOTE — PROGRESS NOTES
"Assessment:     1. Post Concussion Syndrome  2. Post Concussion Headache  3. Anxiety d/t concussion    Discussed: RED FLAGS NEEDING ACUTE EMERGENCY MANAGEMENT:                          Headaches that worsen                          Seizures                          Focal Neurologic Signs                          Drowsiness/Lethargy                          Repeated vomiting                          Slurred Speech                          Inability to recognize people/places                          Increasing confusion/irritability                          Weakness/numbness                          Neck pain                          Unusual behavioral change                          Change in level of consciousness    The patient returns to the concussion clinic for a follow up visit, He was last seen by me on 7/8/2019, where medication changes were made.  Patient reports that he continues to have headaches but he does state that they have reduced in severity and frequency.  He is also able to distinguish between the concussion headache and sinus headache.  He continues to have moments where he forgets or feels off.  He reports that he does have a status where he feels 100%, and there is days where he does feel \"off\".  I have suggested that the patient restart the Wellbutrin, last trial he stated that he did not feel anything.  I am hoping that the Wellbutrin can decrease his distraction and thus make him feel more confident about his memory.      Plan:     Ordered today: Restart Wellbutrin    Neuropsychological assessment completed    Yes   Pain control for headaches - Tylenol only due to rebound headaches, Kiley/blue tinted glasses  Currently doing PT  No   Completed Yes   Currently doing OT  No   Completed No    Currently doing ST   No   Completed No   Psychology  No    MRI  Completed Yes   Labs   completed Yes       Any new medication (other provider):   No   Meds started at last appointment    Meds increased at " last appointment    No      Sleeping Problems-        Currently on medication  Yes    amitriptyline 25- 50 mg        New med  No   Increased/lowered  No  Anxiety due to concussion -        Currently on medication  No          New med  No    Increased/lowered  No  Decreased concentration and focus -       Currently on medication No         New med  No    Increased/lowered  No  Work note written today   No          Subjective:          HPI    He is a 54 y.o. male who initially presented to the concussion clinic on 4/12/19 with a blunt/closed head injury which he sustained while at work on 3/11/19.  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 He hit his head the ball of the [employer], and then maybe again on the ground. He went inside and he attempted to return to work immediately after his fall. However, within 15 minutes of working on his computer he developed a headache, nausea and dizziness.  He was seen in the ER, where a head CT was negative and he was diagnosed with a concussion and encouraged to rest. After taking two weeks off of work, he attempted to return to full-time work and experienced an exacerbation of his symptoms.     Date of accident : 3/11/2019                                                       Workman's Comp   Yes   [facility]   No       Headaches:  Significant ongoing headaches Yes, once a week  Headaches: Intermittently  Improvement :No, same   Current Headache No   Wake with HA  Yes     Worse Headache    4/10           How often: when he has a headache    Average Headache 4/10.    Best Headache 0/10.  Brings on HA:   work  Makes symptoms worse  work  Makes symptoms better. rest  Taking  acetaminophen (Tylenol)        Helpful:  Yes       Physical Symptoms:  Headache-Yes       Since last visit  Improved     Nausea-No            Balance problems - Yes     Since last visit  Improved       Dizziness - No          Visual problems - No         Fatigue - Yes             Since last visit  Same     Sensitivity to light - Yes       Since last visit  Same     Sensitivity to sound - Yes         Since last visit  Improved     Numbness/tingling - Yes, right shoulder     Since last visit  Same         Cognitive Symptoms  Feeling mentally foggy -No       Feeling slowed down -No        Difficulty Concentrating- No     Difficulty remembering - Yes        Since last visit  Same       Emotional Symptoms  Irritability - No         Sadness-  No          More emotional - No      Nervousness/anxiety -No         Psychiatric History:  Anxiety - No  Depression - No  Sleep Disorders - No  Any thought of hurting self or others now?   No  Any history of hurting self or others?            No    Sleep History:  Drowsiness- Yes    Since last visit  Same     Sleep less than usual - No  Sleep more than usual - Yes  Trouble falling asleep - No    Does the patient wake feeling rested - Yes        Since last visit  Same        Migraine Headaches      Patient history of migraines.    Yes      Exertion:         Do the above stated symptoms worsen with physical activity? Yes       Since last visit  Improved           Do the above stated symptoms worsen with cognitive activity? Yes      Since last visit  Same            Work/School        Do the above stated symptoms worsen with school/work?        Yes        Have your returned to work/school? Yes             There are no active problems to display for this patient.    No past medical history on file.  No past surgical history on file.  No family history on file.  Current Outpatient Medications   Medication Sig Dispense Refill     amitriptyline (ELAVIL) 25 MG tablet Take 2 tablets (50 mg total) by mouth at bedtime. 60 tablet 2     buPROPion (WELLBUTRIN XL) 150 MG 24 hr tablet Take 1 tablet (150 mg total) by mouth daily. 30 tablet 2     ibuprofen (ADVIL,MOTRIN) 600 MG tablet Take 600 mg  by mouth.       losartan (COZAAR) 100 MG tablet   0     octreotide (SANDOSTATIN LAR DEPOT) 30 mg IM injection Inject 30 mg into the shoulder, thigh, or buttocks.       octreotide (SANDOSTATIN) 100 mcg/mL Soln Inject 100 mcg under the skin.       ondansetron (ZOFRAN ODT) 4 MG disintegrating tablet Take 1 tablet (4 mg total) by mouth every 8 (eight) hours as needed for nausea. 10 tablet 0     promethazine (PHENERGAN) 25 MG tablet   0     SUMAtriptan (IMITREX) 100 MG tablet Take 100 mg by mouth.       SUMAtriptan (IMITREX) 100 MG tablet   0     SUMAtriptan (IMITREX) 50 MG tablet   3     urea (CARMOL) 40 % Crea Apply to affected area daily.       No current facility-administered medications for this encounter.        Allergies   Allergen Reactions     Flagyl [Metronidazole]      Social History     Socioeconomic History     Marital status:      Spouse name: Not on file     Number of children: Not on file     Years of education: Not on file     Highest education level: Not on file   Occupational History     Not on file   Social Needs     Financial resource strain: Not on file     Food insecurity:     Worry: Not on file     Inability: Not on file     Transportation needs:     Medical: Not on file     Non-medical: Not on file   Tobacco Use     Smoking status: Not on file   Substance and Sexual Activity     Alcohol use: Not on file     Drug use: Not on file     Sexual activity: Not on file   Lifestyle     Physical activity:     Days per week: Not on file     Minutes per session: Not on file     Stress: Not on file   Relationships     Social connections:     Talks on phone: Not on file     Gets together: Not on file     Attends Spiritism service: Not on file     Active member of club or organization: Not on file     Attends meetings of clubs or organizations: Not on file     Relationship status: Not on file     Intimate partner violence:     Fear of current or ex partner: Not on file     Emotionally abused: Not on file      Physically abused: Not on file     Forced sexual activity: Not on file   Other Topics Concern     Not on file   Social History Narrative     Not on file       The following portions of the patient's history were reviewed and updated as appropriate: allergies, current medications, past family history, past medical history, past social history, past surgical history and problem list.    Review of Systems  A comprehensive review of systems was negative except for: What is noted above    Objective:       Discussion was held with the patient today regarding concussion in general including types of injury, symptoms that are common, treatment and variability in time to recover. I also provided written information about concussion and symptoms that would apply to him in his concussion folder. Education about concussion symptoms and length of time it would take the patient to recover was also given to the patient.  I have reassured the patient his symptoms are very common when a concussion is present and will improve with time. We discussed the risks and benefits of the medication including risk of worsening depression with medication adjustments and even the possibility of emergence of suicidal ideations.       Total time spent with the patient today was 30 minutes with greater than 50% of the time spent in counseling and care coordination. The patient will return in about 5 weeks to this clinic for further assessment and treatment. He agrees to call before then with any questions, concerns or problems. We will assess for the appropriateness of possible psychotropic medication trials/changes. The patient will seek out appropriate emergency services should that become necessary.I will be available if any questions, concerns, or problems that arise.     Mental Status Examination  Patient is casually dressed and seated for evaluation. He is cooperative with questioning and eye contact is good. He is fully engaged in  conversation today. He is alert and fully oriented. Speech is normal. Thought processes normal with normal prehension and expression. Thoughts are organized and linear. Content is pertinent to the conversation and without evidence of auditory or visual hallucinations. No delusional ideation. Affect/mood is euthymic-bright, even. Gen. fund of knowledge, insight and memory are normal.

## 2021-06-02 NOTE — PROGRESS NOTES
Assessment:     1. Post Concussion Syndrome  2. Post Concussion Headache  3. Anxiety d/t concussion    Discussed: RED FLAGS NEEDING ACUTE EMERGENCY MANAGEMENT:                          Headaches that worsen                          Seizures                          Focal Neurologic Signs                          Drowsiness/Lethargy                          Repeated vomiting                          Slurred Speech                          Inability to recognize people/places                          Increasing confusion/irritability                          Weakness/numbness                          Neck pain                          Unusual behavioral change                          Change in level of consciousness    The patient returns to the concussion clinic for a follow up visit, He was last seen by me on 9/9/2019, where patient was going to try Wellbutrin, apparently I did send it to the wrong pharmacy so he has not yet tried this med.  The patient is back today stating that he is having a lot of memory issues.  When he went deer hunting he was [place].  He is not remembering time with his wife.  The patient does describe his job as being very chaotic and fast pace.  A long discussion was held with the patient will concussions and treatment plans, the patient does not really give his brain much time to rest.  The patient will try the Wellbutrin to see if this will help decrease his stimulation and thus help with headaches and memory issues.  Patient also states that he did sleep walk once, we will monitor this could be because of the amitriptyline.    Plan:     Ordered today: Restart Wellbutrin    Neuropsychological assessment completed    Yes   Pain control for headaches - Tylenol only due to rebound headaches, Kiley/blue tinted glasses  Currently doing PT  No   Completed Yes   Currently doing OT  No   Completed No    Currently doing ST   No   Completed No   Psychology  No    MRI   Completed Yes   Labs   completed Yes       Any new medication (other provider):   No   Meds started at last appointment    Meds increased at last appointment    No      Sleeping Problems-        Currently on medication  Yes    amitriptyline 25- 50 mg        New med  No   Increased/lowered  No  Anxiety due to concussion -        Currently on medication  No          New med  No    Increased/lowered  No  Decreased concentration and focus -       Currently on medication No         New med  No    Increased/lowered  No  Work note written today   No          Subjective:          HPI    He is a 54 y.o. male who initially presented to the concussion clinic on 4/12/19 with a blunt/closed head injury which he sustained while at work on 3/11/19.  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 He hit his head the ball of the trailer hitch, and then maybe again on the ground. He went inside and he attempted to return to work immediately after his fall. However, within 15 minutes of working on his computer he developed a headache, nausea and dizziness.  He was seen in the ER, where a head CT was negative and he was diagnosed with a concussion and encouraged to rest. After taking two weeks off of work, he attempted to return to full-time work and experienced an exacerbation of his symptoms.     Date of accident : 3/11/2019                                                       Workman's Comp   Yes   [facility]   No       Headaches:  Significant ongoing headaches Yes, once a week  Headaches: Intermittently  Improvement :No, same   Current Headache No   Wake with HA  Yes     Worse Headache    4/10           How often: when he has a headache    Average Headache 4/10.    Best Headache 0/10.  Brings on HA:   work  Makes symptoms worse  work  Makes symptoms better. rest  Taking  acetaminophen (Tylenol)        Helpful:  Yes       Physical  Symptoms:  Headache-Yes       Since last visit  Same     Nausea-No            Balance problems - Yes     Since last visit  Same      Dizziness - No          Visual problems - No         Fatigue - Yes             Since last visit  Same     Sensitivity to light - Yes       Since last visit  Same     Sensitivity to sound - Yes         Since last visit  Improved     Numbness/tingling - Yes, right shoulder     Since last visit  Same         Cognitive Symptoms  Feeling mentally foggy -No       Feeling slowed down -No        Difficulty Concentrating- No     Difficulty remembering - Yes        Since last visit  Worsened       Emotional Symptoms  Irritability - No         Sadness-  No          More emotional - No      Nervousness/anxiety -No         Psychiatric History:  Anxiety - No  Depression - No  Sleep Disorders - No  Any thought of hurting self or others now?   No  Any history of hurting self or others?            No    Sleep History:  Drowsiness- Yes    Since last visit  Same     Sleep less than usual - No  Sleep more than usual - Yes  Trouble falling asleep - No    Does the patient wake feeling rested - Yes        Since last visit  Same        Migraine Headaches      Patient history of migraines.    Yes      Exertion:         Do the above stated symptoms worsen with physical activity? Yes       Since last visit  Improved           Do the above stated symptoms worsen with cognitive activity? Yes      Since last visit  Same            Work/School        Do the above stated symptoms worsen with school/work?        Yes        Have your returned to work/school? Yes             There are no active problems to display for this patient.    No past medical history on file.  No past surgical history on file.  No family history on file.  Current Outpatient Medications   Medication Sig Dispense Refill     amitriptyline (ELAVIL) 25 MG tablet Take 2 tablets (50 mg total) by mouth at bedtime. 60 tablet 2     buPROPion (WELLBUTRIN  XL) 150 MG 24 hr tablet Take 1 tablet (150 mg total) by mouth daily. 30 tablet 2     ibuprofen (ADVIL,MOTRIN) 600 MG tablet Take 600 mg by mouth.       losartan (COZAAR) 100 MG tablet   0     octreotide (SANDOSTATIN LAR DEPOT) 30 mg IM injection Inject 30 mg into the shoulder, thigh, or buttocks every 28 days. 1 each 12     octreotide (SANDOSTATIN) 100 mcg/mL Soln Inject 100 mcg under the skin.       ondansetron (ZOFRAN ODT) 4 MG disintegrating tablet Take 1 tablet (4 mg total) by mouth every 8 (eight) hours as needed for nausea. 10 tablet 0     promethazine (PHENERGAN) 25 MG tablet   0     SUMAtriptan (IMITREX) 100 MG tablet Take 100 mg by mouth.       SUMAtriptan (IMITREX) 100 MG tablet   0     SUMAtriptan (IMITREX) 50 MG tablet   3     urea (CARMOL) 40 % Crea Apply to affected area daily.       No current facility-administered medications for this encounter.        Allergies   Allergen Reactions     Flagyl [Metronidazole]      Social History     Socioeconomic History     Marital status:      Spouse name: Not on file     Number of children: Not on file     Years of education: Not on file     Highest education level: Not on file   Occupational History     Not on file   Social Needs     Financial resource strain: Not on file     Food insecurity:     Worry: Not on file     Inability: Not on file     Transportation needs:     Medical: Not on file     Non-medical: Not on file   Tobacco Use     Smoking status: Not on file   Substance and Sexual Activity     Alcohol use: Not on file     Drug use: Not on file     Sexual activity: Not on file   Lifestyle     Physical activity:     Days per week: Not on file     Minutes per session: Not on file     Stress: Not on file   Relationships     Social connections:     Talks on phone: Not on file     Gets together: Not on file     Attends Rastafari service: Not on file     Active member of club or organization: Not on file     Attends meetings of clubs or organizations: Not  on file     Relationship status: Not on file     Intimate partner violence:     Fear of current or ex partner: Not on file     Emotionally abused: Not on file     Physically abused: Not on file     Forced sexual activity: Not on file   Other Topics Concern     Not on file   Social History Narrative     Not on file       The following portions of the patient's history were reviewed and updated as appropriate: allergies, current medications, past family history, past medical history, past social history, past surgical history and problem list.    Review of Systems  A comprehensive review of systems was negative except for: What is noted above    Objective:       Discussion was held with the patient today regarding concussion in general including types of injury, symptoms that are common, treatment and variability in time to recover. I also provided written information about concussion and symptoms that would apply to him in his concussion folder. Education about concussion symptoms and length of time it would take the patient to recover was also given to the patient.  I have reassured the patient his symptoms are very common when a concussion is present and will improve with time. We discussed the risks and benefits of the medication including risk of worsening depression with medication adjustments and even the possibility of emergence of suicidal ideations.       Total time spent with the patient today was 30 minutes with greater than 50% of the time spent in counseling and care coordination. The patient will return in about 5 weeks to this clinic for further assessment and treatment. He agrees to call before then with any questions, concerns or problems. We will assess for the appropriateness of possible psychotropic medication trials/changes. The patient will seek out appropriate emergency services should that become necessary.I will be available if any questions, concerns, or problems that arise.     Mental  Status Examination  Patient is casually dressed and seated for evaluation. He is cooperative with questioning and eye contact is good. He is fully engaged in conversation today. He is alert and fully oriented. Speech is normal. Thought processes normal with normal prehension and expression. Thoughts are organized and linear. Content is pertinent to the conversation and without evidence of auditory or visual hallucinations. No delusional ideation. Affect/mood is euthymic-bright, even. Gen. fund of knowledge, insight and memory are normal..

## 2021-06-03 NOTE — PROGRESS NOTES
Assessment:     1. Post Concussion Syndrome  2. Post Concussion Headache  3. Anxiety d/t concussion    Discussed: RED FLAGS NEEDING ACUTE EMERGENCY MANAGEMENT:                          Headaches that worsen                          Seizures                          Focal Neurologic Signs                          Drowsiness/Lethargy                          Repeated vomiting                          Slurred Speech                          Inability to recognize people/places                          Increasing confusion/irritability                          Weakness/numbness                          Neck pain                          Unusual behavioral change                          Change in level of consciousness    The patient returns to the concussion clinic for a follow up visit, He was last seen by me on 10/15/2019, where the patient restarted Wellbutrin. He reports that he accidentally stopped the Amitriptyline, he reports that he stopped having headaches. He does report that he has sleepwalked a couple of times since stopping the Amitriptyline. The sleep-walking may be from stopping the Amitriptyline. He will try and restart the medication and see if the sleep-walking stops. He reports that his cognition has improved greatly since restarting the Wellbutrin. 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 I will refer the patient out to the [facility] , I am hoping the [place] [facility] will be able to help the patient. Maybe could be in a research study, the stress of this disease most likely is complicating the patient's full recovery from the concussion. Overall the patient is reporting improvement in his emotional and cognitive concussion. His physical symptoms have worsened and then improved.    Plan:     Ordered today: Restart Amitriptyline, monitor sleep-walking, referral to [facility]    Neuropsychological assessment completed    Yes    Pain control for headaches - Tylenol only due to rebound headaches, Kiley/blue tinted glasses  Currently doing PT  No   Completed Yes   Currently doing OT  No   Completed No    Currently doing ST   No   Completed No   Psychology  No    MRI  Completed Yes   Labs   completed Yes       Any new medication (other provider):   No   Meds started at last appointment    Meds increased at last appointment    No      Sleeping Problems-        Currently on medication  Yes    amitriptyline 25- 50 mg        New med  No   Increased/lowered  No  Anxiety due to concussion -        Currently on medication  No          New med  No    Increased/lowered  No  Decreased concentration and focus -       Currently on medication No         New med  No    Increased/lowered  No  Work note written today   No          Subjective:          HPI    He is a 54 y.o. male who initially presented to the concussion clinic on 4/12/19 with a blunt/closed head injury which he sustained while at work on 3/11/19.  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 He hit his head the ball of the [place] hitch, and then maybe again on the ground. He went inside and he attempted to return to work immediately after his fall. However, within 15 minutes of working on his computer he developed a headache, nausea and dizziness.  He was seen in the ER, where a head CT was negative and he was diagnosed with a concussion and encouraged to rest. After taking two weeks off of work, he attempted to return to full-time work and experienced an exacerbation of his symptoms.     Date of accident : 3/11/2019                                                       Workman's Comp   Yes   [facility]   No       Headaches:  Significant ongoing headaches Yes, once a week  Headaches: Intermittently  Improvement :Yes   Current Headache No   Wake with [place]ON  No, since stopping the Amitriptyline.      Worse Headache    4/10           How often: when he has a headache    Average Headache 4/10.    Best Headache 0/10.  Brings on HA:   work  Makes symptoms worse  work  Makes symptoms better. rest  Taking  acetaminophen (Tylenol)        Helpful:  Yes       Physical Symptoms:  Headache-Yes       Since last visit  Improved     Nausea-No            Balance problems - No  Dizziness - No          Visual problems - No         Fatigue - Yes             Since last visit  Same     Sensitivity to light - Yes       Since last visit  Same     Sensitivity to sound - Yes         Since last visit  Improved     Numbness/tingling - Yes, right shoulder     Since last visit  Same         Cognitive Symptoms  Feeling mentally foggy -No       Feeling slowed down -No        Difficulty Concentrating- No     Difficulty remembering - No    Emotional Symptoms  Irritability - No         Sadness-  No          More emotional - No      Nervousness/anxiety -No         Psychiatric History:  Anxiety - No  Depression - No  Sleep Disorders - No  Any thought of hurting self or others now?   No  Any history of hurting self or others?            No    Sleep History:  Drowsiness- Yes    Since last visit  Same     Sleep less than usual - No  Sleep more than usual - Yes  Trouble falling asleep - No    Does the patient wake feeling rested - Yes        Since last visit  Same        Migraine Headaches      Patient history of migraines.    Yes      Exertion:         Do the above stated symptoms worsen with physical activity? No        Do the above stated symptoms worsen with cognitive activity? No         Work/School        Do the above stated symptoms worsen with school/work?        No        Have your returned to work/school? Yes             There are no active problems to display for this patient.    No past medical history on file.  No past surgical history on file.  No family history on file.  Current Outpatient Medications   Medication Sig Dispense  Refill     amitriptyline (ELAVIL) 25 MG tablet Take 2 tablets (50 mg total) by mouth at bedtime. 60 tablet 2     buPROPion (WELLBUTRIN XL) 150 MG 24 hr tablet Take 1 tablet (150 mg total) by mouth daily. 30 tablet 2     ibuprofen (ADVIL,MOTRIN) 600 MG tablet Take 600 mg by mouth.       losartan (COZAAR) 100 MG tablet   0     octreotide (SANDOSTATIN LAR DEPOT) 30 mg IM injection Inject 30 mg into the shoulder, thigh, or buttocks every 28 days. 1 each 12     octreotide (SANDOSTATIN) 100 mcg/mL Soln Inject 100 mcg under the skin.       ondansetron (ZOFRAN ODT) 4 MG disintegrating tablet Take 1 tablet (4 mg total) by mouth every 8 (eight) hours as needed for nausea. 10 tablet 0     promethazine (PHENERGAN) 25 MG tablet   0     SUMAtriptan (IMITREX) 100 MG tablet Take 100 mg by mouth.       SUMAtriptan (IMITREX) 100 MG tablet   0     SUMAtriptan (IMITREX) 50 MG tablet   3     urea (CARMOL) 40 % Crea Apply to affected area daily.       No current facility-administered medications for this encounter.        Allergies   Allergen Reactions     Flagyl [Metronidazole]      Social History     Socioeconomic History     Marital status:      Spouse name: Not on file     Number of children: Not on file     Years of education: Not on file     Highest education level: Not on file   Occupational History     Not on file   Social Needs     Financial resource strain: Not on file     Food insecurity:     Worry: Not on file     Inability: Not on file     Transportation needs:     Medical: Not on file     Non-medical: Not on file   Tobacco Use     Smoking status: Not on file   Substance and Sexual Activity     Alcohol use: Not on file     Drug use: Not on file     Sexual activity: Not on file   Lifestyle     Physical activity:     Days per week: Not on file     Minutes per session: Not on file     Stress: Not on file   Relationships     Social connections:     Talks on phone: Not on file     Gets together: Not on file     Attends  Caodaism service: Not on file     Active member of club or organization: Not on file     Attends meetings of clubs or organizations: Not on file     Relationship status: Not on file     Intimate partner violence:     Fear of current or ex partner: Not on file     Emotionally abused: Not on file     Physically abused: Not on file     Forced sexual activity: Not on file   Other Topics Concern     Not on file   Social History Narrative     Not on file       The following portions of the patient's history were reviewed and updated as appropriate: allergies, current medications, past family history, past medical history, past social history, past surgical history and problem list.    Review of Systems  A comprehensive review of systems was negative except for: What is noted above    Objective:       Discussion was held with the patient today regarding concussion in general including types of injury, symptoms that are common, treatment and variability in time to recover. I also provided written information about concussion and symptoms that would apply to him in his concussion folder. Education about concussion symptoms and length of time it would take the patient to recover was also given to the patient.  I have reassured the patient his symptoms are very common when a concussion is present and will improve with time. We discussed the risks and benefits of the medication including risk of worsening depression with medication adjustments and even the possibility of emergence of suicidal ideations.       Total time spent with the patient today was 30 minutes with greater than 50% of the time spent in counseling and care coordination. The patient will return in about 6 weeks to this clinic for further assessment and treatment. He agrees to call before then with any questions, concerns or problems. We will assess for the appropriateness of possible psychotropic medication trials/changes. The patient will seek out  appropriate emergency services should that become necessary.I will be available if any questions, concerns, or problems that arise.     Mental Status Examination  Patient is casually dressed and seated for evaluation. He is cooperative with questioning and eye contact is good. He is fully engaged in conversation today. He is alert and fully oriented. Speech is normal. Thought processes normal with normal prehension and expression. Thoughts are organized and linear. Content is pertinent to the conversation and without evidence of auditory or visual hallucinations. No delusional ideation. Affect/mood is euthymic-bright, even. Gen. fund of knowledge, insight and memory are normal.

## 2021-06-04 NOTE — PROGRESS NOTES
Patient being seen for concussion f/u appointment.  Patient has been doing well.  1-2 headaches per week, still some memory lapses, and ringing in the ears.

## 2021-06-04 NOTE — PROGRESS NOTES
Assessment:     1. Post Concussion Syndrome  2. Post Concussion Headache  3. Anxiety d/t concussion    Discussed: RED FLAGS NEEDING ACUTE EMERGENCY MANAGEMENT:                          Headaches that worsen                          Seizures                          Focal Neurologic Signs                          Drowsiness/Lethargy                          Repeated vomiting                          Slurred Speech                          Inability to recognize people/places                          Increasing confusion/irritability                          Weakness/numbness                          Neck pain                          Unusual behavioral change                          Change in level of consciousness    The patient returns to the concussion clinic for a follow up visit, He was last seen by me on 11/20/2019, where the patient was going to restarted the Amitriptyline. He did not restart the meds and reports that he was sleeping well and no sleep walking. I had also referred the patient to the [facility] d/t dumping syndrome, he received a letter stating they were too busy. Patient continues to do well. He has had return of some symptoms but he know they were exasperated by his dumping syndrome.    Plan:     Ordered today:  No medication changes    Neuropsychological assessment completed    Yes   Pain control for headaches - Tylenol only due to rebound headaches, Kiley/blue tinted glasses  Currently doing PT  No   Completed Yes   Currently doing OT  No   Completed No    Currently doing ST   No   Completed No   Psychology  No    MRI  Completed Yes   Labs   completed Yes       Any new medication (other provider):   No   Meds started at last appointment    Meds increased at last appointment    No      Sleeping Problems-        Currently on medication   No  Anxiety due to concussion -        Currently on medication  Yes, Wellbutrin           New med  No    Increased/lowered  No  Decreased concentration and focus  -       Currently on medication Yes, Wellbutrin         New med  No    Increased/lowered  No  Work note written today   No          Subjective:          HPI    He is a 54 Y.O. male who initially presented to the concussion clinic on 4/12/19 with a blunt/closed head injury which he sustained while at work on 3/11/19.  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 He hit his head the ball of the trailer hitch, and then maybe again on the ground. He went inside and he attempted to return to work immediately after his fall. However, within 15 minutes of working on his computer he developed a headache, nausea and dizziness.  He was seen in the ER, where a head CT was negative and he was diagnosed with a concussion and encouraged to rest. After taking two weeks off of work, he attempted to return to full-time work and experienced an exacerbation of his symptoms.     Date of accident : 3/11/2019                                                       Workman's Comp   Yes   RC   Yes       Headaches:  Significant ongoing headaches Yes,   Headaches: Intermittently  Improvement :The patient did have more severe headaches but it was d/T dehydration from his dumping syndromes   Current Headache No   Wake with HA  No,    Worse Headache    6/10           How often: when he has a headache    Average Headache 4/10.    Best Headache 0/10.  Brings on HA:   work  Makes symptoms worse  work  Makes symptoms better. rest  Taking  acetaminophen (Tylenol)        Helpful:  Yes       Physical Symptoms:  Headache-Yes       Since last visit  Worsen     Nausea-No            Balance problems - No  Dizziness - No          Visual problems - No         Fatigue - Yes             Since last visit  Improved     Sensitivity to light - Yes       Since last visit  Same     Sensitivity to sound - Yes         Since last visit  Improved      Numbness/tingling - Yes, right shoulder     Since last visit  Same         Cognitive Symptoms- patient reports that his cognition is not quite to baseline  Feeling mentally foggy -No       Feeling slowed down -No        Difficulty Concentrating- No     Difficulty remembering - No    Emotional Symptoms  Irritability - No         Sadness-  No          More emotional - No      Nervousness/anxiety -No         Psychiatric History:  Anxiety - No  Depression - No  Sleep Disorders - No  Any thought of hurting self or others now?   No  Any history of hurting self or others?            No    Sleep History:  Drowsiness- Yes    Since last visit  Same     Sleep less than usual - No  Sleep more than usual - Yes  Trouble falling asleep - No    Does the patient wake feeling rested - Yes        Since last visit  Same        Migraine Headaches      Patient history of migraines.    Yes      Exertion:         Do the above stated symptoms worsen with physical activity? No        Do the above stated symptoms worsen with cognitive activity? No         Work/School        Do the above stated symptoms worsen with school/work?        No        Have your returned to work/school? Yes             There are no active problems to display for this patient.    No past medical history on file.  No past surgical history on file.  No family history on file.  Current Outpatient Medications   Medication Sig Dispense Refill     amitriptyline (ELAVIL) 25 MG tablet Take 2 tablets (50 mg total) by mouth at bedtime. 60 tablet 2     buPROPion (WELLBUTRIN XL) 150 MG 24 hr tablet Take 1 tablet (150 mg total) by mouth daily. 30 tablet 2     ibuprofen (ADVIL,MOTRIN) 600 MG tablet Take 600 mg by mouth.       losartan (COZAAR) 100 MG tablet   0     octreotide (SANDOSTATIN LAR DEPOT) 30 mg IM injection Inject 30 mg into the shoulder, thigh, or buttocks every 28 days. 1 each 12     octreotide (SANDOSTATIN) 100 mcg/mL Soln Inject 100 mcg under the skin.        ondansetron (ZOFRAN ODT) 4 MG disintegrating tablet Take 1 tablet (4 mg total) by mouth every 8 (eight) hours as needed for nausea. 10 tablet 0     promethazine (PHENERGAN) 25 MG tablet   0     SUMAtriptan (IMITREX) 100 MG tablet Take 100 mg by mouth.       SUMAtriptan (IMITREX) 100 MG tablet   0     SUMAtriptan (IMITREX) 50 MG tablet   3     urea (CARMOL) 40 % Crea Apply to affected area daily.       No current facility-administered medications for this encounter.        Allergies   Allergen Reactions     Flagyl [Metronidazole]      Social History     Socioeconomic History     Marital status:      Spouse name: Not on file     Number of children: Not on file     Years of education: Not on file     Highest education level: Not on file   Occupational History     Not on file   Social Needs     Financial resource strain: Not on file     Food insecurity:     Worry: Not on file     Inability: Not on file     Transportation needs:     Medical: Not on file     Non-medical: Not on file   Tobacco Use     Smoking status: Not on file   Substance and Sexual Activity     Alcohol use: Not on file     Drug use: Not on file     Sexual activity: Not on file   Lifestyle     Physical activity:     Days per week: Not on file     Minutes per session: Not on file     Stress: Not on file   Relationships     Social connections:     Talks on phone: Not on file     Gets together: Not on file     Attends Christian service: Not on file     Active member of club or organization: Not on file     Attends meetings of clubs or organizations: Not on file     Relationship status: Not on file     Intimate partner violence:     Fear of current or ex partner: Not on file     Emotionally abused: Not on file     Physically abused: Not on file     Forced sexual activity: Not on file   Other Topics Concern     Not on file   Social History Narrative     Not on file       The following portions of the patient's history were reviewed and updated as  appropriate: allergies, current medications, past family history, past medical history, past social history, past surgical history and problem list.    Review of Systems  A comprehensive review of systems was negative except for: What is noted above    Objective:       Discussion was held with the patient today regarding concussion in general including types of injury, symptoms that are common, treatment and variability in time to recover. I also provided written information about concussion and symptoms that would apply to him in his concussion folder. Education about concussion symptoms and length of time it would take the patient to recover was also given to the patient.  I have reassured the patient his symptoms are very common when a concussion is present and will improve with time. We discussed the risks and benefits of the medication including risk of worsening depression with medication adjustments and even the possibility of emergence of suicidal ideations.       Total time spent with the patient today was 30 minutes with greater than 50% of the time spent in counseling and care coordination. The patient will return in about 3 months to this clinic for further assessment and treatment. He agrees to call before then with any questions, concerns or problems. We will assess for the appropriateness of possible psychotropic medication trials/changes. The patient will seek out appropriate emergency services should that become necessary.I will be available if any questions, concerns, or problems that arise.     Mental Status Examination  Patient is casually dressed and seated for evaluation. He is cooperative with questioning and eye contact is good. He is fully engaged in conversation today. He is alert and fully oriented. Speech is normal. Thought processes normal with normal prehension and expression. Thoughts are organized and linear. Content is pertinent to the conversation and without evidence of auditory or  visual hallucinations. No delusional ideation. Affect/mood is euthymic-bright, even. Gen. fund of knowledge, insight and memory are normal.

## 2021-06-19 NOTE — LETTER
Letter by Mavis Ross FNP at      Author: Mavis Ross FNP Service: -- Author Type: --    Filed:  Date of Service:  Status: (Other)         April 2, 2019     Patient: Dixon Good   YOB: 1965   Date of Visit: 4/2/2019       To Whom It May Concern:    It is my medical opinion that Dixon Good may return to work on 4/3/19. Employees recovering from concussions often exhibit cognitive symptoms that make attending work and learning difficult. They may not be able to attend work or only partial days. Some symptoms that could affect performance in the work environment  include: sensitivity to light and noise, headache, trouble focusing, concentrating, or remembering, and difficulty looking at a screen. The accommodations below often help reduce the symptoms and allow them to return to work quicker. Compliance with these accommodations allows the brain to recover more quickly even if it appears the employee is symptom free.     Attendance Restrictions  Full days as tolerated   Other Accommodations:  1. Limit exposure to computer screens  2. Allow patient to take 10 minute breaks every 60 minutes   3. Allow patient to take a break if he starts to have symptoms, If symptoms continue or worsen please allow patient to return home.  4. Allow employee to wear sunglasses and hat to help patient's sensitivity to lights  .  5. Please put a screen filter on the patient's computer   6. If patient feels overwhelmed at the office please allow patient to work from home if available  7. If patient wakes with severe headache please allow patient to start work later, it symptoms worsen patient should not attempt to work.      If you have any questions or concerns, please don't hesitate to call.    Sincerely,        Electronically signed by EMMA Greenwood

## 2021-06-19 NOTE — LETTER
Letter by Hellen Jacques, Ph.D,LP at      Author: Hellen Jacques, Ph.D,LP Service: -- Author Type: --    Filed:  Date of Service:  Status: (Other)         April 15, 2019     Patient: Dixon Good   YOB: 1965   Date of Visit: 4/15/2019       To Whom It May Concern:     Dixon Good is being followed in the concussion clinic at [facility] for an injury he sustained on 3/11/19. It is my clinical opinion that he is capable of working in a modified capacity, but will require accommodations to address his post-concussive symptoms. Specifically, Mr. Good will need to work a 6 hour day, until his follow-up appointment in our clinic on 4/30/19, at which point his need for accommodations will be reviewed and updated.    If you have any questions or concerns, please don't hesitate to call.    Sincerely,        Electronically signed by Hellen Jacques, Ph.D,LP

## 2021-06-19 NOTE — LETTER
Letter by Mavis Ross FNP at      Author: Mavis Ross FNP Service: -- Author Type: --    Filed:  Date of Service:  Status: (Other)         April 30, 2019     Patient: Dixon Good   YOB: 1965   Date of Visit: 4/30/2019       To Whom It May Concern:    It is my medical opinion that Dixon Good may return to work on 4/30/19. Employees recovering from concussions often exhibit cognitive symptoms that make attending work and learning difficult. They may not be able to attend work or only partial days. Some symptoms that could affect performance in the work environment  include: sensitivity to light and noise, headache, trouble focusing, concentrating, or remembering, and difficulty looking at a screen. The accommodations below often help reduce the symptoms and allow them to return to work quicker. Compliance with these accommodations allows the brain to recover more quickly even if it appears the employee is symptom free.     Attendance Restrictions  The patient is allowed to work 6 hours 5 days a week. If the patient can go an entire week without any worsening of symptoms he can increase hours a day worked by one hour. If not able to tolerate he should reduce hours a day worked by 30 minutes for two days then attempt increase again.   Other Accommodations:  1. Limit exposure to computer screens  2. Allow patient to take 10 minute breaks every 60 minutes   3. Allow patient to take a break if he starts to have symptoms, If symptoms continue or worsen please allow patient to return home.  4. Allow employee to wear sunglasses and hat to help patient's sensitivity to lights  .  5. Please put a screen filter on the patient's computer   6. If patient feels overwhelmed at the office please allow patient to work from home if available  7. If patient wakes with severe headache please allow patient to start work later, it symptoms worsen patient should not attempt to work.      If you have any  questions or concerns, please don't hesitate to call.    Sincerely,        Electronically signed by EMMA Greenwood

## 2021-06-19 NOTE — LETTER
Letter by Hellen Jacques, Ph.D,LP at      Author: Hellen Jacques, Ph.D,LP Service: -- Author Type: --    Filed:  Date of Service:  Status: (Other)         Dixon Good  [address] [facility] [address]           June 17, 2019     To Whom It May Concern:     Dixon Good should be allowed to continue to work a modified schedule. Employees recovering  from concussions often exhibit cognitive symptoms that make attending work and learning difficult.  They may not be able to attend work or only partial days. Some symptoms that could affect  performance in the work environment  include: sensitivity to light and noise, headache, trouble  focusing, concentrating, or remembering, and difficulty looking at a screen. The accommodations  below often help reduce the symptoms and allow them to return to work quicker. Compliance with  these accommodations allows the brain to recover more quickly even if it appears the employee is  symptom free.     Attendance Restrictions  The patient is allowed to work 6 hours 5 days a week. If the patient can go an entire week without any worsening of symptoms he can increase hours a day worked by one hour. If not able to tolerate he should reduce hours a day worked by 30 minutes for two days then attempt increase again.     Other Accommodations:  1. Limit exposure to computer screens  2. Allow patient to take 10 minute breaks every 60 minutes   3. Allow patient to take a break if he starts to have symptoms, If symptoms continue or worsen please allow patient to return home.  4.Please put a screen filter on the patient's computer   5. If patient feels overwhelmed at the office please allow patient to work from home if available  6. If patient wakes with severe headache please allow patient to start work later, it symptoms worsen patient should not attempt to work.      If you have any questions or concerns, please don't hesitate to  call.    Sincerely,      Electronically signed by Hellen Jacques, Ph.D,LP

## 2021-06-27 NOTE — PROGRESS NOTES
"Progress Notes by Yunior Palacio PT Student at 5/9/2019  2:56 PM     Author: Yunior Palacio PT Student Service: -- Author Type: PT Student    Filed: 5/10/2019  8:30 AM Date of Service: 5/9/2019  2:56 PM Status: Attested    : Yunior Palacio PT Student (PT Student)    Related Notes: Original Note by Yunior Palacio PT Student (PT Student) filed at 5/9/2019  5:20 PM    Cosigner: Mike Figueroa PT at 5/10/2019  9:44 AM    Attestation signed by Mike Figueroa PT at 5/10/2019  9:44 AM    SPT under direct supervision of PT with PT directing treatment and plan of care.    Mike Figueroa PT, DPT, NCS                Physical Therapy  Physical Therapy Daily Outpatient Documentation        Rx Units: 4 NR    Total OP Minutes: 60  Treatment #: 4/8    Pain: 0/10, 0/10  Location: headache, \"dizzy\"   Intervention: see below    Subjective:  Patient states he has been feeling much better since last visit.  Last time he had a headache was last Saturday (5 days ago), and has had decreased frequency of \"dizziness\".  Reports he mostly notices the dizziness when he stands up from a bent position when playing with his dogs.  States he has been avoiding these bending motions (under cars) at work d/t the dizziness he experiences with it.  Notes he still can tolerate only 5-6 hours of work per day, especially since lately he has had more computer work to do that is highly stressful.  Continues to have problems with crowds, loud noises, and the light from his work computer.  States he uses dimmed light and filters on his computer screen to help with tolerance.  He did not attempt water skiing this past weekend, but states he plans to possibly attempt it for the first time this coming weekend.       Therapeutic Exercise  Total Minutes:    Neuro Re-Ed/Balance  Total Minutes: 60    Treadmill training with no UE A (total time= 10 min):  -2 min at 2.4 mph - normal gait, warm up - SBA  -1 min at 2.1 mph - " "horizontal head turns - SBA, 4/10 \"wobbly/unsteady\", min/mod path deviation (L>R)  -1 min at 2.1 mph - normal gait - SBA, symptoms return to baseline  -1 min at 2.1 mph - vertical head turns - SBA, 1/10 \"wobbly/unsteady\", min path deviation  -1 min at 2.1mph - normal gait - SBA, symptoms return to baseline  -1 min at 2.1 mph - R diagonal head turns - SBA, 4/10 \"wobbly/unsteady\", min/mod path deviation, increased scissor pattern, one near LOB corrected with CGA from PT  -1 min at 2.1 mph - normal gait - SBA, symptoms return to baseline  -1 min at 2.1 mph - L diagonal head turns - SBA, 2/10 \"wobbly/unsteady\", min path deviation, increased scissor pattern   -1 min at 2.1 mph - normal gait, cool down - SBA, symptoms return to baseline   -No increase in HA symptoms throughout duration of activity. VC throughout activity to engage abdominals/gluts and taller posture for better postural control. Stopping treadmill increased symptoms to 5/10 \"wobbly/unsteady\".  Required 2 min standing rest break after treadmill stopped d/t increase in \"wobbly/unsteady\" sensation, was able to return to chair for seated rest break after symptoms settled.     Standing balance activities on foam beam, Romberg stance, perpendicular to beam:  -Horizontal head turns x 60 sec - SBA, minimal/no postural sway, no increase in symptoms  -Vertical head turns x 60 sec - SBA, minimal/no postural sway, no increase in symptoms  -EC x 20 sec, 30 sec, 60 sec - SBA, moderate postural sway, no increase in symptoms   -VC throughout activities to engage trunk mm for better postural control, taller posture     Standing balance on foam beam, tandem stance, parallel to beam - holding resistance band in both UE to simulate ski rope:  -Slow horizontal (right/left) rotation with consistent speed x 1 min for 2 reps (each foot forward) - SBA, leaning posteriorly against resistance  -Random direction with inconsistent speed x 3 min for 2 reps (each foot forward) - SBA, " "leaning posteriorly against resistance  -Random direction with inconsistent speed x 2 min with each cognitive task (10 min total) (list [place] states, repeat sentence backwards, alphabetize words, order words by length, find various objects in room) - SBA, leaning posteriorly against resistance, was able to maintain balance with attention shift to cog task to same level as w/o cog task   -VC for engaging trunk mm for better postural control, multiple LOB laterally (R>L) self corrected with stepping strategy with every condition     Gaze stabilization exercises:  -Horizontal, Romberg stance, perpendicular on foam beam x 60 sec - SBA, no increase in symptoms or postural sway  -Vertical, Romberg stance, perpendicular on foam beam x 60 sec - SBA, no increase in symptoms or postural sway  -Horizontal, tandem stance, parallel on foam beam x 15 sec, 25 sec, 18 sec, 15 sec - SBA, increase in \"dizziness\" 3/10, min postural sway  -Vertical, tandem stance, parallel on foam beam x 45 sec, 45 sec - SBA, no increase in symptoms or postural sway   -VC for engaging trunk mm for better postural control    VORx2 on firm ground, normal ANAI:  -Horizontal x 15 sec for 4 reps - SBA, limited by difficulty coordinating arm and head rotations, no increase in symptoms or postural sway   -Vertical 60 sec for 2 reps - SBA, therapist manually guided arm to demonstrate correct range of movement, limited by difficulty coordinating arm and head movements, no increase in symptoms or postural sway   -VC for technique, correct range of movement, and engaging trunk mm for better postural control     Horizontal saccades on firm ground, plain background, normal ANAI x 45 sec - SBA, undershooting to L and increased time to focus on L target, no increase in symptoms   -VC for technique and engaging trunk mm for better postural control     Reviewed HEP and instructed in additional habituation exercises for home including 360 degree turning against wall, " "bending down/up in sitting and standing positions, horizontal saccades, gaze stabilization in tandem stance, and decrease visual fixation with wobble board balance    Gait Training  Total Minutes:     Other:   Total Minutes:     HEP:  -gaze stabilization on busy background, with walking, tandem  -semi-sharpened romberg standing balance activities   -dynamic gait activities   -habituation exercises    Assessment/Plan for week of 5/6/19 - 5/10/19:  Patient presented to therapy motivated to participate in all activities.  He continues to demonstrate difficulty maintaining postural stability during conditions with head turns with treadmill training, however shows evidence of habituation with decrease in symptom provocation and less disturbed gait with activity this date. Static balance activities were progressed to tandem stance on foam with \"pulling\" perturbations to simulate water skiing, with and without cognitive dual task.  He experienced multiple LOB laterally with all conditions with this activity, initially relying heavily on visual fixation but progressing to maintaining balance without visual fixation.  Gaze stabilization exercises were progressed to tandem stance on foam and VORx2, which provided increased challenge to postural stability and was mildly symptom-provoking.  HEP was reviewed and progressed.  Plan to continue progressing dynamic balance exercises that simulate high-level recreational sports and gaze stabilization exercises that simulate work environment to improve balance and overall functional mobility to allow for complete return to full-time work hours and recreational sports.       Functional Goals:  to be met by 8 visits  -Pt will report decreased intensity of headache pain to 2/10 at worst  -Pt will report decreased intensity of dizziness symptoms to 1/10 at worst  -Pt will score >/= 28/30 on FGA to demonstrate improved balance in order to return to high level activities   -Pt will be " independent with HEP and home management of symptoms

## 2021-06-27 NOTE — PROGRESS NOTES
"Progress Notes by Yunior Palacio, PT Student at 5/2/2019  2:45 PM     Author: Yunior Palacio, PT Student Service: -- Author Type: PT Student    Filed: 5/2/2019  4:53 PM Date of Service: 5/2/2019  2:45 PM Status: Attested    : Yunior Palacio PT Student (PT Student) Cosigner: Mike Figueroa PT at 5/3/2019 11:38 AM    Attestation signed by Mike Figueroa PT at 5/3/2019 11:38 AM    SPT under direct supervision of PT with PT directing treatment and plan of care.    Mike Figueroa, PT, DPT, NCS                Physical Therapy  Physical Therapy Daily Outpatient Documentation        Rx Units: 4 NR    Total OP Minutes: 60    Treatment #: 3/8    Pain: 2/10, 2/10  Location: headache, \"dizzy\"   Intervention: see below    Subjective: Patient states there is no change in status since last visit.  Has tried standing on wobble board he owns at home to challenge his balance and is able to maintain balance when visually focused on one point, but loses balance when moving head to look around room.  Has also been doing gaze stabilization exercises at home and they are going well.  Still tolerating 6 hours of work per day, but no more.  Reports he plans on possibly attempting water skiing this coming weekend.     Therapeutic Exercise  Total Minutes:      Neuro Re-Ed/Balance  Total Minutes: 60    Treadmill training with no UE A (total time= 15 min):  -4 min at 2.1 mph - normal gait, warm up - SBA  -1 min at 2.1 mph - horizontal head turns - SBA, 6/10 \"wobbly/unsteady\", moderate path deviation, more path deviations with head turn L   -1 min at 2.1 mph - normal gait - SBA, symptoms return to baseline  -1 min at 2.1 mph - vertical head turns - SBA, 3/10 \"wobbly/unsteady\", minimal path deviation  -1 min at 2.1mph - normal gait - SBA, symptoms return to baseline  -1 min at 2.1 mph - R diagonal head turns - SBA, 3/10 \"wobbly/unsteady\", minimal path deviation, pulls to R when looking to L, increased scissor " "pattern    -1 min at 2.1 mph - normal gait - SBA, symptoms return to baseline  -1 min at 2.1 mph - L diagonal head turns - SBA, 4/10 \"wobbly/unsteady\", minimal path deviation with head turn L, increased scissor pattern   -1 min at 2.1 mph - normal gait - SBA, symptoms return to baseline  -1 min at 2.1 mph - horizontal head turns - SBA, 6/10 \"wobbly/unsteady\", moderate path deviation, path deviation towards direction of head turn, increased scissor pattern  -2 min at 2.1 mph - normal gait, cool down - SBA, symptoms   -No increase in HA symptoms throughout duration of activity. VC throughout activity to engage abdominals/gluts and taller posture for better postural control. Stopping treadmill increased symptoms to 6/10 \"wobbly/unsteady\".  Required 2 min standing rest break after treadmill stopped d/t increase in \"wobbly/unsteady\" sensation, was able to return to chair for seated rest break after symptoms settled.     Overground walking with SBA:  -Backward EO x 40' for 2 reps - no increase in symptoms, no path deviation, appropriate pace  -Backward EO with 180 deg turn x 40' - no increase in symptoms, no path deviation    -Forward with vertical head turns x 40' - no increase in symptoms, no path deviation  -Backward with vertical head turns x 40' - increase in symptoms 3/10 \"wobbly/unsteady\", mild path deviation  -Forward EC x 40' for 3 reps - no increase in symptoms, significant path deviation to R on first rep with VC to correct by veering L, moderate path deviation to L on second rep with VC to correct by decreasing overcorrection L, and mild path deviation to L on third rep; required cues to stop and reposition to center of hallway during first two reps to avoid running into walls  -Tandem forward EO x 40' - no increase in symptoms, slightly unsteady but no path deviation   -Tandem backward EO x 40' - no increase in symptoms, slightly unsteady but no path deviation   -Braiding EO x 40' each direction - no increase " "in symptoms, slowed pace   -L diagonal tossing ball behind to therapist x 40' for 2 reps - increase in symptoms 6/10 \"wobbly/unsteady\" after first rep, 5/10 \"wobbly/unsteady\" after second rep, path deviation to L, slow pace, unsteady  -R diagonal tossing ball behind to therapist x 40' for 2 reps - increase in symptoms 3/10 \"wobbly/unsteady\" after first rep, 2/10 \"wobbly/unsteady\" after second rep, path deviation to R, slow pace, unsteady    -VC throughout activities to engage trunk mm for better postural control, taller posture     Semi-Sharpened Romberg activities on firm ground:  -Horizontal head turns x 60 sec - SBA, minimal postural sway   -Vertical head turns x 60 sec - SBA, minimal postural sway  -L diagonal w/ 5# DB x 60 reps - SBA, minimal postural sway  -R diagonal w/ 5# DB x 60 reps - SBA, minimal postural sway   -VC throughout activities to engage trunk mm for better postural control, taller posture     Standing balance activities on bosu ball, flat side down:  -Static stance, EO x 60 sec - SBA, mild postural sway that decreased with time as he became accustomed to task  -Weight shifting laterally x 10 reps each direction - SBA, mild postural sway that decreased with repetition, no LOB, reported visually focusing on single point to support balance  -Marching x 10 reps each side - SBA, mild postural sway, mild instability that decreased with repetition, no LOB, reported visually focusing on single point to support balance  -Weight shifting A/P x 10 reps each direction - SBA/CGA, mod postural sway, no LOB, reported medium difficulty   -Mini squats x 10 reps - SBA, min instability, no LOB, fast pace   -Horizontal head turns x 60 sec - SBA/CGA, min/mod postural sway and instability, no LOB, reported 3/10 \"wobbly/unsteady\" feeling   -Throw/catch unweighted ball x 2 min - SBA, min postural sway and instability, one posterior LOB corrected with mat table, pt maintained static head position and relied heavily on " ocular mobility and peripheral vision to complete task    -VC throughout activities to engage trunk mm for better postural control, taller posture       Gait Training  Total Minutes:       Other:   Total Minutes:     HEP:  -gaze stabilization on busy background, with walking  -semi-sharpened romberg standing balance activities   -dynamic gait activities     Assessment/Plan for week of 4/29/19 - 5/3/19:  Patient presented to therapy motivated to participate in all activities.  He continues to demonstrate difficulty maintaining postural stability during conditions with head turns with both dynamic and static activities, however shows evidence of habituation with decrease in symptom provocation with repetition.  Dynamic gait activities overground showed he still has difficulty maintaining straight path in EC condition, but has better postural control and is less symptomatic with backwards walking and tandem walking.  Static balance activities were progressed to bosu ball, with all conditions pt was able to maintain balance with relatively static head position and relying heavily on visual fixation technique for stability.  HEP was reviewed and progressed.  Plan to continue progressing dynamic balance exercises that simulate high-level recreational sports and gaze stabilization exercises that simulate work environment to improve balance and overall functional mobility to allow for complete return to full-time work hours and recreational sports.           Functional Goals:  to be met by 8 visits  -Pt will report decreased intensity of headache pain to 2/10 at worst  -Pt will report decreased intensity of dizziness symptoms to 1/10 at worst  -Pt will score >/= 28/30 on FGA to demonstrate improved balance in order to return to high level activities   -Pt will be independent with HEP and home management of symptoms

## 2021-06-27 NOTE — PROGRESS NOTES
"Progress Notes by Yunior Palacio, PT Student at 4/25/2019  2:53 PM     Author: Yunior Palacio PT Student Service: -- Author Type: PT Student    Filed: 4/26/2019  1:58 PM Date of Service: 4/25/2019  2:53 PM Status: Attested    : Yunior Palacio, PT Student (PT Student)    Related Notes: Original Note by Yunior Palacio PT Student (PT Student) filed at 4/25/2019  5:00 PM    Cosigner: Mike Figueroa PT at 4/26/2019  2:15 PM    Attestation signed by Mike Figueroa PT at 4/26/2019  2:15 PM    SPT under direct supervision of PT with PT directing treatment and plan of care.    Mike Figueroa PT, DPT, NCS                Physical Therapy  Physical Therapy Daily Outpatient Documentation        Rx Units: 4 NR    Total OP Minutes: 60    Treatment #: 2/8    Pain: 3/10, 0/10  Location: headache, \"dizzy\"   Intervention: see below    Subjective: Patient states he has been doing well since last visit, is only working 6 hours per day which has been better for managing symptoms.  Still complains of \"unsteadiness\"/\"dizziness\" intermittently and states his balance is still a problem.  Has been doing the gaze stabilization exercises at home with success, can do each (horizontal and vertical) for close to a minute before symptoms.      Therapeutic Exercise  Total Minutes:      Neuro Re-Ed/Balance  Total Minutes: 60    Treadmill training with single UE A (total time= 15 min):  -5 min at 2.2 mph - normal gait, warm up - SBA  -1 min at 2.2 mph - horizontal head turns - SBA, mild pull to L, 6/10 \"wobbly/unsteady\", minimal path deviation  -1 min at 2.2 mph - normal gait - SBA, symptoms return to baseline  -1 min at 2.2 mph - vertical head turns - SBA, no increase in postural sway, no increase in symptoms, minimal path deviation   -1 min at 2.2 mph - normal gait - SBA, no increase in symptoms  -1 min at 2.2 mph - horizontal head turns - SBA, mild pull to L, 4/10 \"wobbly/unsteady\", minimal path deviation  -1 min " "at 2.2 mph - normal gait - SBA, symptoms return to baseline  -1 min at 2.2 mph - L diagonal head turns - SBA, no increase in symptoms, reports feeling like legs are \"drunk walking\", mild scissoring gait pattern  -1 min at 2.2 mph - normal gait - SBA, no increase in symptoms  -1 min at 2.2 mph - R diagonal head turns - SBA, no increase in symptoms, reports feeling like legs are \"drunk walking\", mild scissoring gait pattern   -1 min at 2.2 mph - normal gait - SBA, no increase in symptoms   -1 min at 2.2 mph - normal gait, no UE A - SBA, mild pull to L, 3/10 \"wobbly/unsteady\", mild scissoring gait pattern   -1 min at 2.2 mph - normal gait, cool down - SBA, symptoms return to baseline   -No increase in HA symptoms throughout duration of activity. VC throughout activity to engage abdominals/gluts and taller posture for better postural control. Stopping treadmill increased symptoms to 5/10 \"wobbly/unsteady\".  Required 2 min standing rest break after treadmill stopped d/t increase in \"wobbly/unsteady\" sensation, was able to return to chair for seated rest break after symptoms settled.     Static standing balance (Romberg) on blue foam:  -EC x 60 sec - SBA, increase postural sway and pull to L  -EO horizontal head turns x 60 sec - SBA, minimal sway, reported 2/10 \"wobbly/unsteady\"  -EO vertical head turns x 60 sec - SBA, no increase in symptoms   -EO L diagonal w/ 2# med ball x 60 sec - SBA, minimal sway, no increase in symptoms   -EO R diagonal w/ 2# med ball x 60 sec - SBA, minimal sway, reported 5/10 \"wobbly/unsteady\"  -Catch and throw 2# med ball x 60 sec - SBA, no increase in symptoms   -VC throughout activities to engage trunk mm for better postural control, taller posture, and breathing through activity    Static standing balance (one leg on blue foam, other leg on 12\" box):   -EC x 55 sec R stance leg - SBA, increase in postural sway and pull to L, LOB at end corrected with Kermit from therapist   -EC x 60 sec L " "stance leg - SBA, increase in postural sway and pull to L, no LOB   -EO horizontal head turns x 60 sec on each stance leg - SBA, minimal increase in sway, no LOB, reported no increase in symptoms   -EO vertical head turns x 60 sec on each stance leg - SBA, no increase in sway, no LOB, reported no increase in symptoms  -EO diagonal w/ 2# med ball x 60 sec on each stance leg - SBA, minimal increase in sway, no LOB, reported 4/10 \"wobbly/unstead\" (L>R)    -VC throughout activities to engage trunk mm for better postural control, taller posture, and breathing through activity     Gaze stabilization exercises in standing - SBA, Romberg stance:  -Horizontal on plain background x 55 sec - no increase in sway, mild increase in symptoms at end  -Vertical on plain background x 60 sec - no increase in sway, no increase in symptoms  -Horizontal on busy background x 30 sec - no increase in sway, mild increase in symptoms at end  -Vertical on busy background x 60 sec - no increase in sway, no increase in symptoms  -Vertical on busy background w/ forward/backward walking x 60 sec - difficulty coordinating head movements with walking, no increase in sway or LOB, reports increase to 5/10 \"wobbly/unsteady\" post    -VC throughout activities to engage trunk mm for better postural control, taller posture, and breathing through activity    Semi-Sharpened Romberg activities:  -Horizontal head turns x 30 sec - SBA, minimal postural sway  -L diagonal w/ 5# DB x 10 reps - SBA, minimal postural sway  -R diagonal w/ 5# DB x 10 reps - SBA, minimal postural sway   -VC throughout activities to engage trunk mm for better postural control, taller posture     Attempted Sharpened Romberg stance w/ horizontal head turns - SBA, LOB to right side corrected with CGA from therapist     Gait Training  Total Minutes:       Other:   Total Minutes:     HEP:  -gaze stabilization on busy background, with walking  -semi-sharpened romberg standing balance " "activities     Assessment/Plan for week of 4/22/19 - 4/26/19:  Patient presented to therapy motivated to participate in all activities.  He demonstrated improved tolerance to dynamic and static standing balance with horizontal head turns with repetition, as evidenced by a decrease in symptom provocation with each trial (on treadmill and on foam).  He also demonstrated no difficulties or symptom provocation with vertical head turns in all conditions.  Postural instability increased most with eyes closed conditions, and reports feeling the most \"wobbly\" when visual input is eliminated. Gaze stabilization exercises for home were progressed to include busy background for horizontal head turns, and busy background with walking for vertical head turns.  To continue progressing dynamic and static standing balance exercises and gaze stabilization exercises to improve balance and overall functional mobility to allow for complete return to full-time work hours and recreational sports.        Functional Goals:  to be met by 8 visits  -Pt will report decreased intensity of headache pain to 2/10 at worst  -Pt will report decreased intensity of dizziness symptoms to 1/10 at worst  -Pt will score >/= 28/30 on FGA to demonstrate improved balance in order to return to high level activities   -Pt will be independent with HEP and home management of symptoms                      "

## 2021-07-03 NOTE — ADDENDUM NOTE
Addendum Note by Kiya Savage CMA at 4/2/2019 11:59 PM     Author: Kiya Savage CMA Service: -- Author Type: Certified Medical Assistant    Filed: 4/9/2019  9:55 AM Date of Service: 4/2/2019 11:59 PM Status: Signed    : Kiya Savage CMA (Certified Medical Assistant)    Encounter addended by: Kiya Savage CMA on: 4/9/2019  9:55 AM      Actions taken: Charge Capture section accepted

## 2022-02-15 ENCOUNTER — MEDICAL CORRESPONDENCE (OUTPATIENT)
Dept: HEALTH INFORMATION MANAGEMENT | Facility: CLINIC | Age: 57
End: 2022-02-15
Payer: COMMERCIAL

## 2022-02-23 DIAGNOSIS — Z11.59 ENCOUNTER FOR SCREENING FOR OTHER VIRAL DISEASES: Primary | ICD-10-CM

## 2022-03-16 ENCOUNTER — TRANSFERRED RECORDS (OUTPATIENT)
Dept: MULTI SPECIALTY CLINIC | Facility: CLINIC | Age: 57
End: 2022-03-16
Payer: COMMERCIAL

## 2022-03-16 LAB
CREATININE (EXTERNAL): 0.93 MG/DL (ref 0.72–1.25)
GFR ESTIMATED (EXTERNAL): >90 ML/MIN/1.73M2
GLUCOSE (EXTERNAL): 93 MG/DL (ref 70–100)
POTASSIUM (EXTERNAL): 4.4 MMOL/L (ref 3.5–5.1)

## 2022-03-27 ENCOUNTER — ANESTHESIA EVENT (OUTPATIENT)
Dept: SURGERY | Facility: CLINIC | Age: 57
End: 2022-03-27
Payer: COMMERCIAL

## 2022-03-28 ENCOUNTER — ANESTHESIA (OUTPATIENT)
Dept: SURGERY | Facility: CLINIC | Age: 57
End: 2022-03-28
Payer: COMMERCIAL

## 2022-03-28 ENCOUNTER — APPOINTMENT (OUTPATIENT)
Dept: RADIOLOGY | Facility: CLINIC | Age: 57
End: 2022-03-28
Attending: ORTHOPAEDIC SURGERY
Payer: COMMERCIAL

## 2022-03-28 ENCOUNTER — HOSPITAL ENCOUNTER (OUTPATIENT)
Facility: CLINIC | Age: 57
Discharge: HOME | End: 2022-03-29
Attending: ORTHOPAEDIC SURGERY | Admitting: ORTHOPAEDIC SURGERY
Payer: COMMERCIAL

## 2022-03-28 PROBLEM — G95.9 CERVICAL MYELOPATHY (H): Status: ACTIVE | Noted: 2022-03-28

## 2022-03-28 PROCEDURE — 250N000011 HC RX IP 250 OP 636: Performed by: ORTHOPAEDIC SURGERY

## 2022-03-28 PROCEDURE — 250N000025 HC SEVOFLURANE, PER MIN: Performed by: ORTHOPAEDIC SURGERY

## 2022-03-28 PROCEDURE — 710N000010 HC RECOVERY PHASE 1, LEVEL 2, PER MIN: Performed by: ORTHOPAEDIC SURGERY

## 2022-03-28 PROCEDURE — 250N000009 HC RX 250: Performed by: NURSE ANESTHETIST, CERTIFIED REGISTERED

## 2022-03-28 PROCEDURE — 250N000013 HC RX MED GY IP 250 OP 250 PS 637: Performed by: FAMILY MEDICINE

## 2022-03-28 PROCEDURE — C1713 ANCHOR/SCREW BN/BN,TIS/BN: HCPCS | Performed by: ORTHOPAEDIC SURGERY

## 2022-03-28 PROCEDURE — 370N000017 HC ANESTHESIA TECHNICAL FEE, PER MIN: Performed by: ORTHOPAEDIC SURGERY

## 2022-03-28 PROCEDURE — 250N000011 HC RX IP 250 OP 636: Performed by: NURSE ANESTHETIST, CERTIFIED REGISTERED

## 2022-03-28 PROCEDURE — 272N000001 HC OR GENERAL SUPPLY STERILE: Performed by: ORTHOPAEDIC SURGERY

## 2022-03-28 PROCEDURE — 360N000084 HC SURGERY LEVEL 4 W/ FLUORO, PER MIN: Performed by: ORTHOPAEDIC SURGERY

## 2022-03-28 PROCEDURE — 258N000003 HC RX IP 258 OP 636: Performed by: NURSE ANESTHETIST, CERTIFIED REGISTERED

## 2022-03-28 PROCEDURE — 250N000013 HC RX MED GY IP 250 OP 250 PS 637: Performed by: NURSE PRACTITIONER

## 2022-03-28 PROCEDURE — 250N000013 HC RX MED GY IP 250 OP 250 PS 637: Performed by: ANESTHESIOLOGY

## 2022-03-28 PROCEDURE — 258N000003 HC RX IP 258 OP 636: Performed by: ANESTHESIOLOGY

## 2022-03-28 PROCEDURE — 99204 OFFICE O/P NEW MOD 45 MIN: CPT | Performed by: FAMILY MEDICINE

## 2022-03-28 PROCEDURE — 250N000011 HC RX IP 250 OP 636: Performed by: NURSE PRACTITIONER

## 2022-03-28 PROCEDURE — 250N000005 HC OR RX SURGIFLO HEMOSTATIC MATRIX 10ML 199102S OPNP: Performed by: ORTHOPAEDIC SURGERY

## 2022-03-28 PROCEDURE — 250N000013 HC RX MED GY IP 250 OP 250 PS 637: Performed by: ORTHOPAEDIC SURGERY

## 2022-03-28 PROCEDURE — 258N000003 HC RX IP 258 OP 636: Performed by: ORTHOPAEDIC SURGERY

## 2022-03-28 PROCEDURE — 250N000011 HC RX IP 250 OP 636: Performed by: ANESTHESIOLOGY

## 2022-03-28 PROCEDURE — 250N000009 HC RX 250: Performed by: ORTHOPAEDIC SURGERY

## 2022-03-28 PROCEDURE — 999N000141 HC STATISTIC PRE-PROCEDURE NURSING ASSESSMENT: Performed by: ORTHOPAEDIC SURGERY

## 2022-03-28 PROCEDURE — 999N000182 XR SURGERY CARM FLUORO GREATER THAN 5 MIN: Mod: TC

## 2022-03-28 PROCEDURE — 250N000011 HC RX IP 250 OP 636: Performed by: FAMILY MEDICINE

## 2022-03-28 DEVICE — IMP SCR MEDT POST PEDICLE CENTERPIECE 2.6X5MM ST 853-465: Type: IMPLANTABLE DEVICE | Site: SPINE CERVICAL | Status: FUNCTIONAL

## 2022-03-28 DEVICE — IMP PLATE MEDT POST CERVICAL 10MM OVAL GRAFT 853-410: Type: IMPLANTABLE DEVICE | Site: SPINE CERVICAL | Status: FUNCTIONAL

## 2022-03-28 RX ORDER — PROPRANOLOL HYDROCHLORIDE 40 MG/1
40 TABLET ORAL 2 TIMES DAILY
Status: DISCONTINUED | OUTPATIENT
Start: 2022-03-28 | End: 2022-03-29 | Stop reason: HOSPADM

## 2022-03-28 RX ORDER — ONDANSETRON 2 MG/ML
4 INJECTION INTRAMUSCULAR; INTRAVENOUS EVERY 30 MIN PRN
Status: DISCONTINUED | OUTPATIENT
Start: 2022-03-28 | End: 2022-03-28 | Stop reason: HOSPADM

## 2022-03-28 RX ORDER — NALOXONE HYDROCHLORIDE 0.4 MG/ML
0.4 INJECTION, SOLUTION INTRAMUSCULAR; INTRAVENOUS; SUBCUTANEOUS
Status: DISCONTINUED | OUTPATIENT
Start: 2022-03-28 | End: 2022-03-29 | Stop reason: HOSPADM

## 2022-03-28 RX ORDER — CEFAZOLIN SODIUM 1 G/3ML
1 INJECTION, POWDER, FOR SOLUTION INTRAMUSCULAR; INTRAVENOUS EVERY 8 HOURS
Status: COMPLETED | OUTPATIENT
Start: 2022-03-28 | End: 2022-03-29

## 2022-03-28 RX ORDER — ACETAMINOPHEN 325 MG/1
975 TABLET ORAL EVERY 8 HOURS
Status: DISCONTINUED | OUTPATIENT
Start: 2022-03-28 | End: 2022-03-29 | Stop reason: HOSPADM

## 2022-03-28 RX ORDER — SODIUM CHLORIDE 9 MG/ML
INJECTION, SOLUTION INTRAVENOUS CONTINUOUS
Status: DISCONTINUED | OUTPATIENT
Start: 2022-03-28 | End: 2022-03-29 | Stop reason: HOSPADM

## 2022-03-28 RX ORDER — PROPRANOLOL HYDROCHLORIDE 40 MG/1
40 TABLET ORAL 2 TIMES DAILY
COMMUNITY

## 2022-03-28 RX ORDER — SUMATRIPTAN 50 MG/1
50-100 TABLET, FILM COATED ORAL
COMMUNITY

## 2022-03-28 RX ORDER — OXYCODONE HYDROCHLORIDE 5 MG/1
10 TABLET ORAL EVERY 4 HOURS PRN
Status: DISCONTINUED | OUTPATIENT
Start: 2022-03-28 | End: 2022-03-28 | Stop reason: ALTCHOICE

## 2022-03-28 RX ORDER — HYDROMORPHONE HYDROCHLORIDE 1 MG/ML
0.5 INJECTION, SOLUTION INTRAMUSCULAR; INTRAVENOUS; SUBCUTANEOUS
Status: DISCONTINUED | OUTPATIENT
Start: 2022-03-28 | End: 2022-03-29 | Stop reason: HOSPADM

## 2022-03-28 RX ORDER — FENTANYL CITRATE 50 UG/ML
INJECTION, SOLUTION INTRAMUSCULAR; INTRAVENOUS PRN
Status: DISCONTINUED | OUTPATIENT
Start: 2022-03-28 | End: 2022-03-28

## 2022-03-28 RX ORDER — KETAMINE HYDROCHLORIDE 10 MG/ML
INJECTION INTRAMUSCULAR; INTRAVENOUS PRN
Status: DISCONTINUED | OUTPATIENT
Start: 2022-03-28 | End: 2022-03-28

## 2022-03-28 RX ORDER — VANCOMYCIN HYDROCHLORIDE 1 G/20ML
1 INJECTION, POWDER, LYOPHILIZED, FOR SOLUTION INTRAVENOUS
Status: DISCONTINUED | OUTPATIENT
Start: 2022-03-28 | End: 2022-03-28 | Stop reason: HOSPADM

## 2022-03-28 RX ORDER — LIDOCAINE 40 MG/G
CREAM TOPICAL
Status: DISCONTINUED | OUTPATIENT
Start: 2022-03-28 | End: 2022-03-28 | Stop reason: HOSPADM

## 2022-03-28 RX ORDER — HYDROMORPHONE HYDROCHLORIDE 1 MG/ML
0.5 INJECTION, SOLUTION INTRAMUSCULAR; INTRAVENOUS; SUBCUTANEOUS ONCE
Status: COMPLETED | OUTPATIENT
Start: 2022-03-28 | End: 2022-03-28

## 2022-03-28 RX ORDER — CEFAZOLIN SODIUM/WATER 2 G/20 ML
2 SYRINGE (ML) INTRAVENOUS
Status: COMPLETED | OUTPATIENT
Start: 2022-03-28 | End: 2022-03-28

## 2022-03-28 RX ORDER — IBUPROFEN 200 MG
600 TABLET ORAL EVERY 6 HOURS PRN
Status: ON HOLD | COMMUNITY
End: 2022-03-29

## 2022-03-28 RX ORDER — GINSENG 100 MG
CAPSULE ORAL PRN
Status: DISCONTINUED | OUTPATIENT
Start: 2022-03-28 | End: 2022-03-28 | Stop reason: HOSPADM

## 2022-03-28 RX ORDER — CEFAZOLIN SODIUM/WATER 2 G/20 ML
2 SYRINGE (ML) INTRAVENOUS SEE ADMIN INSTRUCTIONS
Status: DISCONTINUED | OUTPATIENT
Start: 2022-03-28 | End: 2022-03-28 | Stop reason: HOSPADM

## 2022-03-28 RX ORDER — SODIUM CHLORIDE, SODIUM LACTATE, POTASSIUM CHLORIDE, CALCIUM CHLORIDE 600; 310; 30; 20 MG/100ML; MG/100ML; MG/100ML; MG/100ML
INJECTION, SOLUTION INTRAVENOUS CONTINUOUS
Status: DISCONTINUED | OUTPATIENT
Start: 2022-03-28 | End: 2022-03-28 | Stop reason: HOSPADM

## 2022-03-28 RX ORDER — DEXAMETHASONE SODIUM PHOSPHATE 10 MG/ML
INJECTION, SOLUTION INTRAMUSCULAR; INTRAVENOUS PRN
Status: DISCONTINUED | OUTPATIENT
Start: 2022-03-28 | End: 2022-03-28

## 2022-03-28 RX ORDER — NALOXONE HYDROCHLORIDE 0.4 MG/ML
0.2 INJECTION, SOLUTION INTRAMUSCULAR; INTRAVENOUS; SUBCUTANEOUS
Status: DISCONTINUED | OUTPATIENT
Start: 2022-03-28 | End: 2022-03-29 | Stop reason: HOSPADM

## 2022-03-28 RX ORDER — ONDANSETRON 4 MG/1
4 TABLET, ORALLY DISINTEGRATING ORAL EVERY 8 HOURS PRN
COMMUNITY

## 2022-03-28 RX ORDER — GABAPENTIN 300 MG/1
300 CAPSULE ORAL 3 TIMES DAILY
Status: DISCONTINUED | OUTPATIENT
Start: 2022-03-28 | End: 2022-03-28

## 2022-03-28 RX ORDER — GABAPENTIN 300 MG/1
300 CAPSULE ORAL 3 TIMES DAILY
Status: DISCONTINUED | OUTPATIENT
Start: 2022-03-28 | End: 2022-03-29 | Stop reason: HOSPADM

## 2022-03-28 RX ORDER — MULTIVITAMIN,THERAPEUTIC
1 TABLET ORAL DAILY
Status: DISCONTINUED | OUTPATIENT
Start: 2022-03-29 | End: 2022-03-29 | Stop reason: HOSPADM

## 2022-03-28 RX ORDER — ONDANSETRON 2 MG/ML
4 INJECTION INTRAMUSCULAR; INTRAVENOUS EVERY 6 HOURS PRN
Status: DISCONTINUED | OUTPATIENT
Start: 2022-03-28 | End: 2022-03-29 | Stop reason: HOSPADM

## 2022-03-28 RX ORDER — PROCHLORPERAZINE MALEATE 10 MG
10 TABLET ORAL EVERY 6 HOURS PRN
Status: DISCONTINUED | OUTPATIENT
Start: 2022-03-28 | End: 2022-03-29 | Stop reason: HOSPADM

## 2022-03-28 RX ORDER — HYDROMORPHONE HCL IN WATER/PF 6 MG/30 ML
0.2 PATIENT CONTROLLED ANALGESIA SYRINGE INTRAVENOUS
Status: DISCONTINUED | OUTPATIENT
Start: 2022-03-28 | End: 2022-03-28

## 2022-03-28 RX ORDER — BUPIVACAINE HYDROCHLORIDE AND EPINEPHRINE 2.5; 5 MG/ML; UG/ML
2 INJECTION, SOLUTION EPIDURAL; INFILTRATION; INTRACAUDAL; PERINEURAL ONCE
Status: COMPLETED | OUTPATIENT
Start: 2022-03-28 | End: 2022-03-28

## 2022-03-28 RX ORDER — LORATADINE 10 MG/1
10 TABLET ORAL DAILY PRN
Status: DISCONTINUED | OUTPATIENT
Start: 2022-03-28 | End: 2022-03-29 | Stop reason: HOSPADM

## 2022-03-28 RX ORDER — HYDROXYZINE HYDROCHLORIDE 25 MG/1
25-50 TABLET, FILM COATED ORAL EVERY 4 HOURS PRN
Status: DISCONTINUED | OUTPATIENT
Start: 2022-03-28 | End: 2022-03-29 | Stop reason: HOSPADM

## 2022-03-28 RX ORDER — AMOXICILLIN 250 MG
1 CAPSULE ORAL 2 TIMES DAILY
Status: DISCONTINUED | OUTPATIENT
Start: 2022-03-28 | End: 2022-03-29 | Stop reason: HOSPADM

## 2022-03-28 RX ORDER — GINSENG 100 MG
CAPSULE ORAL
Status: DISCONTINUED
Start: 2022-03-28 | End: 2022-03-28 | Stop reason: HOSPADM

## 2022-03-28 RX ORDER — OXYCODONE HYDROCHLORIDE 5 MG/1
5 TABLET ORAL EVERY 4 HOURS PRN
Status: DISCONTINUED | OUTPATIENT
Start: 2022-03-28 | End: 2022-03-28 | Stop reason: HOSPADM

## 2022-03-28 RX ORDER — HYDROMORPHONE HCL IN WATER/PF 6 MG/30 ML
0.2 PATIENT CONTROLLED ANALGESIA SYRINGE INTRAVENOUS EVERY 5 MIN PRN
Status: DISCONTINUED | OUTPATIENT
Start: 2022-03-28 | End: 2022-03-28 | Stop reason: HOSPADM

## 2022-03-28 RX ORDER — PROPOFOL 10 MG/ML
INJECTION, EMULSION INTRAVENOUS PRN
Status: DISCONTINUED | OUTPATIENT
Start: 2022-03-28 | End: 2022-03-28

## 2022-03-28 RX ORDER — ONDANSETRON 2 MG/ML
INJECTION INTRAMUSCULAR; INTRAVENOUS PRN
Status: DISCONTINUED | OUTPATIENT
Start: 2022-03-28 | End: 2022-03-28

## 2022-03-28 RX ORDER — PANTOPRAZOLE SODIUM 20 MG/1
40 TABLET, DELAYED RELEASE ORAL EVERY EVENING
Status: DISCONTINUED | OUTPATIENT
Start: 2022-03-28 | End: 2022-03-29 | Stop reason: HOSPADM

## 2022-03-28 RX ORDER — ACETAMINOPHEN 325 MG/1
650 TABLET ORAL EVERY 4 HOURS PRN
Status: DISCONTINUED | OUTPATIENT
Start: 2022-03-31 | End: 2022-03-29 | Stop reason: HOSPADM

## 2022-03-28 RX ORDER — ONDANSETRON 4 MG/1
4 TABLET, ORALLY DISINTEGRATING ORAL EVERY 30 MIN PRN
Status: DISCONTINUED | OUTPATIENT
Start: 2022-03-28 | End: 2022-03-28 | Stop reason: HOSPADM

## 2022-03-28 RX ORDER — VANCOMYCIN HYDROCHLORIDE 1 G/20ML
INJECTION, POWDER, LYOPHILIZED, FOR SOLUTION INTRAVENOUS
Status: DISCONTINUED
Start: 2022-03-28 | End: 2022-03-28 | Stop reason: HOSPADM

## 2022-03-28 RX ORDER — POLYETHYLENE GLYCOL 3350 17 G/17G
17 POWDER, FOR SOLUTION ORAL DAILY
Status: DISCONTINUED | OUTPATIENT
Start: 2022-03-29 | End: 2022-03-29 | Stop reason: HOSPADM

## 2022-03-28 RX ORDER — FENTANYL CITRATE 50 UG/ML
100 INJECTION, SOLUTION INTRAMUSCULAR; INTRAVENOUS ONCE
Status: DISCONTINUED | OUTPATIENT
Start: 2022-03-28 | End: 2022-03-28 | Stop reason: HOSPADM

## 2022-03-28 RX ORDER — FENTANYL CITRATE 50 UG/ML
100 INJECTION, SOLUTION INTRAMUSCULAR; INTRAVENOUS
Status: DISCONTINUED | OUTPATIENT
Start: 2022-03-28 | End: 2022-03-28 | Stop reason: HOSPADM

## 2022-03-28 RX ORDER — MAGNESIUM HYDROXIDE 1200 MG/15ML
LIQUID ORAL PRN
Status: DISCONTINUED | OUTPATIENT
Start: 2022-03-28 | End: 2022-03-28 | Stop reason: HOSPADM

## 2022-03-28 RX ORDER — VANCOMYCIN HYDROCHLORIDE 1 G/20ML
INJECTION, POWDER, LYOPHILIZED, FOR SOLUTION INTRAVENOUS PRN
Status: DISCONTINUED | OUTPATIENT
Start: 2022-03-28 | End: 2022-03-28 | Stop reason: HOSPADM

## 2022-03-28 RX ORDER — LOSARTAN POTASSIUM 100 MG/1
100 TABLET ORAL DAILY
COMMUNITY

## 2022-03-28 RX ORDER — HYDROMORPHONE HCL IN WATER/PF 6 MG/30 ML
0.4 PATIENT CONTROLLED ANALGESIA SYRINGE INTRAVENOUS
Status: DISCONTINUED | OUTPATIENT
Start: 2022-03-28 | End: 2022-03-28

## 2022-03-28 RX ORDER — LOSARTAN POTASSIUM 50 MG/1
100 TABLET ORAL EVERY EVENING
Status: DISCONTINUED | OUTPATIENT
Start: 2022-03-28 | End: 2022-03-29 | Stop reason: HOSPADM

## 2022-03-28 RX ORDER — GABAPENTIN 300 MG/1
300 CAPSULE ORAL
Status: COMPLETED | OUTPATIENT
Start: 2022-03-28 | End: 2022-03-28

## 2022-03-28 RX ORDER — ONDANSETRON 4 MG/1
4 TABLET, ORALLY DISINTEGRATING ORAL EVERY 6 HOURS PRN
Status: DISCONTINUED | OUTPATIENT
Start: 2022-03-28 | End: 2022-03-29 | Stop reason: HOSPADM

## 2022-03-28 RX ORDER — BUPIVACAINE HYDROCHLORIDE AND EPINEPHRINE 2.5; 5 MG/ML; UG/ML
INJECTION, SOLUTION EPIDURAL; INFILTRATION; INTRACAUDAL; PERINEURAL
Status: DISCONTINUED
Start: 2022-03-28 | End: 2022-03-28 | Stop reason: HOSPADM

## 2022-03-28 RX ORDER — PHENOL 1.4 %
30 AEROSOL, SPRAY (ML) MUCOUS MEMBRANE
COMMUNITY

## 2022-03-28 RX ORDER — BISACODYL 10 MG
10 SUPPOSITORY, RECTAL RECTAL DAILY PRN
Status: DISCONTINUED | OUTPATIENT
Start: 2022-03-28 | End: 2022-03-29 | Stop reason: HOSPADM

## 2022-03-28 RX ORDER — FENTANYL CITRATE 50 UG/ML
25 INJECTION, SOLUTION INTRAMUSCULAR; INTRAVENOUS EVERY 5 MIN PRN
Status: DISCONTINUED | OUTPATIENT
Start: 2022-03-28 | End: 2022-03-28 | Stop reason: HOSPADM

## 2022-03-28 RX ORDER — LIDOCAINE HYDROCHLORIDE 10 MG/ML
INJECTION, SOLUTION INFILTRATION; PERINEURAL PRN
Status: DISCONTINUED | OUTPATIENT
Start: 2022-03-28 | End: 2022-03-28

## 2022-03-28 RX ORDER — HYDROMORPHONE HYDROCHLORIDE 2 MG/1
2-4 TABLET ORAL
Status: DISCONTINUED | OUTPATIENT
Start: 2022-03-28 | End: 2022-03-29 | Stop reason: HOSPADM

## 2022-03-28 RX ORDER — OXYCODONE HYDROCHLORIDE 5 MG/1
5 TABLET ORAL EVERY 4 HOURS PRN
Status: DISCONTINUED | OUTPATIENT
Start: 2022-03-28 | End: 2022-03-28 | Stop reason: ALTCHOICE

## 2022-03-28 RX ADMIN — SODIUM CHLORIDE: 9 INJECTION, SOLUTION INTRAVENOUS at 13:49

## 2022-03-28 RX ADMIN — ACETAMINOPHEN 975 MG: 325 TABLET ORAL at 21:21

## 2022-03-28 RX ADMIN — ROCURONIUM BROMIDE 20 MG: 10 INJECTION, SOLUTION INTRAVENOUS at 09:23

## 2022-03-28 RX ADMIN — DEXAMETHASONE SODIUM PHOSPHATE 10 MG: 10 INJECTION, SOLUTION INTRAMUSCULAR; INTRAVENOUS at 07:41

## 2022-03-28 RX ADMIN — ONDANSETRON 4 MG: 2 INJECTION INTRAMUSCULAR; INTRAVENOUS at 10:58

## 2022-03-28 RX ADMIN — GABAPENTIN 300 MG: 300 CAPSULE ORAL at 21:19

## 2022-03-28 RX ADMIN — PANTOPRAZOLE SODIUM 40 MG: 20 TABLET, DELAYED RELEASE ORAL at 21:21

## 2022-03-28 RX ADMIN — HYDROMORPHONE HYDROCHLORIDE 0.4 MG: 0.2 INJECTION, SOLUTION INTRAMUSCULAR; INTRAVENOUS; SUBCUTANEOUS at 14:10

## 2022-03-28 RX ADMIN — CEFAZOLIN 1 G: 1 INJECTION, POWDER, FOR SOLUTION INTRAMUSCULAR; INTRAVENOUS at 19:33

## 2022-03-28 RX ADMIN — MIDAZOLAM 2 MG: 1 INJECTION INTRAMUSCULAR; INTRAVENOUS at 07:32

## 2022-03-28 RX ADMIN — Medication 2 G: at 07:30

## 2022-03-28 RX ADMIN — HYDROMORPHONE HYDROCHLORIDE 0.5 MG: 1 INJECTION, SOLUTION INTRAMUSCULAR; INTRAVENOUS; SUBCUTANEOUS at 17:46

## 2022-03-28 RX ADMIN — SODIUM CHLORIDE, POTASSIUM CHLORIDE, SODIUM LACTATE AND CALCIUM CHLORIDE: 600; 310; 30; 20 INJECTION, SOLUTION INTRAVENOUS at 08:20

## 2022-03-28 RX ADMIN — HYDROXYZINE HYDROCHLORIDE 50 MG: 25 TABLET ORAL at 21:18

## 2022-03-28 RX ADMIN — HYDROMORPHONE HYDROCHLORIDE 0.2 MG: 0.2 INJECTION, SOLUTION INTRAMUSCULAR; INTRAVENOUS; SUBCUTANEOUS at 12:29

## 2022-03-28 RX ADMIN — PROPOFOL 150 MG: 10 INJECTION, EMULSION INTRAVENOUS at 07:41

## 2022-03-28 RX ADMIN — PROPRANOLOL HYDROCHLORIDE 40 MG: 40 TABLET ORAL at 21:19

## 2022-03-28 RX ADMIN — HYDROMORPHONE HYDROCHLORIDE 0.2 MG: 0.2 INJECTION, SOLUTION INTRAMUSCULAR; INTRAVENOUS; SUBCUTANEOUS at 12:24

## 2022-03-28 RX ADMIN — SODIUM CHLORIDE: 9 INJECTION, SOLUTION INTRAVENOUS at 21:28

## 2022-03-28 RX ADMIN — Medication 2 G: at 11:34

## 2022-03-28 RX ADMIN — HYDROMORPHONE HYDROCHLORIDE 0.2 MG: 0.2 INJECTION, SOLUTION INTRAMUSCULAR; INTRAVENOUS; SUBCUTANEOUS at 12:19

## 2022-03-28 RX ADMIN — HYDROMORPHONE HYDROCHLORIDE 0.5 MG: 1 INJECTION, SOLUTION INTRAMUSCULAR; INTRAVENOUS; SUBCUTANEOUS at 11:02

## 2022-03-28 RX ADMIN — PHENYLEPHRINE HYDROCHLORIDE 100 MCG: 10 INJECTION INTRAVENOUS at 08:02

## 2022-03-28 RX ADMIN — FENTANYL CITRATE 100 MCG: 50 INJECTION, SOLUTION INTRAMUSCULAR; INTRAVENOUS at 07:41

## 2022-03-28 RX ADMIN — SENNOSIDES AND DOCUSATE SODIUM 1 TABLET: 50; 8.6 TABLET ORAL at 21:20

## 2022-03-28 RX ADMIN — LIDOCAINE HYDROCHLORIDE 30 MG: 10 INJECTION, SOLUTION INFILTRATION; PERINEURAL at 07:41

## 2022-03-28 RX ADMIN — LOSARTAN POTASSIUM 100 MG: 50 TABLET, FILM COATED ORAL at 21:19

## 2022-03-28 RX ADMIN — HYDROMORPHONE HYDROCHLORIDE 0.25 MG: 1 INJECTION, SOLUTION INTRAMUSCULAR; INTRAVENOUS; SUBCUTANEOUS at 08:27

## 2022-03-28 RX ADMIN — GABAPENTIN 300 MG: 300 CAPSULE ORAL at 17:45

## 2022-03-28 RX ADMIN — HYDROMORPHONE HYDROCHLORIDE 4 MG: 2 TABLET ORAL at 21:23

## 2022-03-28 RX ADMIN — HYDROMORPHONE HYDROCHLORIDE 4 MG: 2 TABLET ORAL at 16:46

## 2022-03-28 RX ADMIN — FENTANYL CITRATE 25 MCG: 50 INJECTION, SOLUTION INTRAMUSCULAR; INTRAVENOUS at 12:04

## 2022-03-28 RX ADMIN — HYDROMORPHONE HYDROCHLORIDE 0.2 MG: 0.2 INJECTION, SOLUTION INTRAMUSCULAR; INTRAVENOUS; SUBCUTANEOUS at 12:39

## 2022-03-28 RX ADMIN — HYDROMORPHONE HYDROCHLORIDE 0.2 MG: 0.2 INJECTION, SOLUTION INTRAMUSCULAR; INTRAVENOUS; SUBCUTANEOUS at 12:34

## 2022-03-28 RX ADMIN — GABAPENTIN 300 MG: 300 CAPSULE ORAL at 06:46

## 2022-03-28 RX ADMIN — ACETAMINOPHEN 975 MG: 325 TABLET ORAL at 14:10

## 2022-03-28 RX ADMIN — SODIUM CHLORIDE, POTASSIUM CHLORIDE, SODIUM LACTATE AND CALCIUM CHLORIDE: 600; 310; 30; 20 INJECTION, SOLUTION INTRAVENOUS at 06:50

## 2022-03-28 RX ADMIN — PHENYLEPHRINE HYDROCHLORIDE 100 MCG: 10 INJECTION INTRAVENOUS at 08:05

## 2022-03-28 RX ADMIN — FENTANYL CITRATE 25 MCG: 50 INJECTION, SOLUTION INTRAMUSCULAR; INTRAVENOUS at 11:57

## 2022-03-28 RX ADMIN — HYDROXYZINE HYDROCHLORIDE 50 MG: 25 TABLET ORAL at 16:08

## 2022-03-28 RX ADMIN — SUGAMMADEX 200 MG: 100 INJECTION, SOLUTION INTRAVENOUS at 11:35

## 2022-03-28 RX ADMIN — ROCURONIUM BROMIDE 50 MG: 10 INJECTION, SOLUTION INTRAVENOUS at 07:41

## 2022-03-28 RX ADMIN — HYDROMORPHONE HYDROCHLORIDE 0.5 MG: 1 INJECTION, SOLUTION INTRAMUSCULAR; INTRAVENOUS; SUBCUTANEOUS at 16:09

## 2022-03-28 RX ADMIN — KETAMINE HYDROCHLORIDE 20 MG: 10 INJECTION, SOLUTION INTRAMUSCULAR; INTRAVENOUS at 07:41

## 2022-03-28 RX ADMIN — FENTANYL CITRATE 25 MCG: 50 INJECTION, SOLUTION INTRAMUSCULAR; INTRAVENOUS at 12:09

## 2022-03-28 RX ADMIN — ROCURONIUM BROMIDE 20 MG: 10 INJECTION, SOLUTION INTRAVENOUS at 08:17

## 2022-03-28 RX ADMIN — OXYCODONE HYDROCHLORIDE 5 MG: 5 TABLET ORAL at 12:19

## 2022-03-28 RX ADMIN — HYDROMORPHONE HYDROCHLORIDE 0.25 MG: 1 INJECTION, SOLUTION INTRAMUSCULAR; INTRAVENOUS; SUBCUTANEOUS at 08:44

## 2022-03-28 RX ADMIN — FENTANYL CITRATE 25 MCG: 50 INJECTION, SOLUTION INTRAMUSCULAR; INTRAVENOUS at 12:14

## 2022-03-28 NOTE — PHARMACY-ADMISSION MEDICATION HISTORY
Pharmacy Note - Admission Medication History    Pertinent Provider Information: n/a   ______________________________________________________________________    Prior To Admission (PTA) med list completed and updated in EMR.       PTA Med List   Medication Sig Note Last Dose     diphenhydrAMINE-acetaminophen (TYLENOL PM)  MG tablet Take 2 tablets by mouth At Bedtime  3/27/2022 at Unknown time     ibuprofen (ADVIL/MOTRIN) 200 MG tablet Take 600 mg by mouth every 6 hours as needed (pain)  3/14/2022     losartan (COZAAR) 100 MG tablet Take 100 mg by mouth every evening  3/26/2022     Melatonin 10 MG TABS tablet Take 30 mg by mouth At Bedtime 3/28/2022: Verified patient takes 3 x 10 mg tablets. 3/27/2022 at Unknown time     omeprazole (PRILOSEC) 20 MG DR capsule Take 20 mg by mouth every evening  3/27/2022 at Unknown time     ondansetron (ZOFRAN-ODT) 4 MG ODT tab Take 4 mg by mouth every 8 hours as needed for nausea or vomiting  prn     propranolol (INDERAL) 40 MG tablet Take 40 mg by mouth 2 times daily  3/27/2022 at Unknown time     SUMAtriptan (IMITREX) 50 MG tablet Take 100 mg by mouth at onset of headache for migraine  2/28/2022       Information source(s): Patient, Prescription bottles and [facility]    Method of interview communication: in-person    Patient was asked about OTC/herbal products specifically.  PTA med list reflects this.    Based on the pharmacist's assessment, the PTA med list information appears reliable    Allergies were reviewed, assessed, and updated with the patient.      Patient does not use any multi-dose medications prior to admission.     Thank you for the opportunity to participate in the care of this patient.      Skye Ivy RPH     3/28/2022     7:23 AM

## 2022-03-28 NOTE — ANESTHESIA PREPROCEDURE EVALUATION
Anesthesia Pre-Procedure Evaluation    Patient: Dixon Good   MRN: [id number] : 1965        Procedure : Procedure(s):  CERVICAL LAMINOPLASTY CERVICAL 4 - CERVICAL 6,  CERVICAL 3 LAMINECTOMY          Past Medical History:   Diagnosis Date     Dumping syndrome      Hypoglycemia       Past Surgical History:   Procedure Laterality Date     APPENDECTOMY       ARTHROSCOPY SHOULDER, OPEN ROTATOR CUFF REPAIR, COMBINED  2013    Procedure: COMBINED ARTHROSCOPY SHOULDER, OPEN ROTATOR CUFF REPAIR;  Left shoulder arthroscopy mini-open rotator cuff repair ;  Surgeon: Biju Saenz MD;  Location: [facility] OR     CHOLECYSTECTOMY       STOMACH SURGERY       VASECTOMY        Allergies   Allergen Reactions     Flagyl [Metronidazole] Headache     migraines      Social History     Tobacco Use     Smoking status: Never Smoker     Smokeless tobacco: Current User     Types: Chew   Substance Use Topics     Alcohol use: Yes      Wt Readings from Last 1 Encounters:   22 74.8 kg (165 lb)        Anesthesia Evaluation   Pt has had prior anesthetic.     No history of anesthetic complications (Pt erroneously listed in one section of [facility] chart as having MH without further comment; this is not corroborated elsewhere, and pt denies any personal or family history of MH. )       ROS/MED HX  ENT/Pulmonary:  - neg pulmonary ROS     Neurologic: Comment: Migraines      Cardiovascular:  - neg cardiovascular ROS     METS/Exercise Tolerance:     Hematologic:       Musculoskeletal:       GI/Hepatic: Comment: Hx of dumping syndrome      Renal/Genitourinary:  - neg Renal ROS     Endo:  - neg endo ROS     Psychiatric/Substance Use:       Infectious Disease:       Malignancy:       Other:            Physical Exam    Airway        Mallampati: I   TM distance: > 3 FB   Neck ROM: limited   Mouth opening: > 3 cm    Respiratory Devices and Support         Dental  no notable dental history         Cardiovascular          Rhythm and rate: regular      Pulmonary           breath sounds clear to auscultation           OUTSIDE LABS:  CBC:   Lab Results   Component Value Date    WBC 6.2 04/02/2019    HGB 15.4 04/02/2019    HCT 46.3 04/02/2019     04/02/2019     BMP:   Lab Results   Component Value Date     04/02/2019    POTASSIUM 4.1 04/02/2019    CHLORIDE 101 04/02/2019    CO2 28 04/02/2019    BUN 9 04/02/2019    CR 0.90 04/02/2019    GLC 86 04/02/2019     COAGS: No results found for: PTT, INR, FIBR  POC: No results found for: BGM, HCG, HCGS  HEPATIC:   Lab Results   Component Value Date    ALBUMIN 4.0 04/02/2019    PROTTOTAL 7.2 04/02/2019    ALT 21 04/02/2019    AST 20 04/02/2019    ALKPHOS 61 04/02/2019    BILITOTAL 0.4 04/02/2019     OTHER:   Lab Results   Component Value Date    ABRIL 9.8 04/02/2019    MAG 2.6 04/02/2019    TSH 0.78 04/02/2019    CRP 2.4 04/02/2019    SED 6 04/02/2019       Anesthesia Plan    ASA Status:  2   NPO Status:  NPO Appropriate    Anesthesia Type: General.     - Airway: ETT              Consents    Anesthesia Plan(s) and associated risks, benefits, and realistic alternatives discussed. Questions answered and patient/representative(s) expressed understanding.     - Discussed: Risks, Benefits and Alternatives for the PROCEDURE were discussed     - Discussed with:  Patient, Spouse         Postoperative Care    Pain management: IV analgesics, Oral pain medications.   PONV prophylaxis: Ondansetron (or other 5HT-3), Dexamethasone or Solumedrol     Comments:    Other Comments: Glidescope intubation, avoid neck extension  No neuromonitoring planned            Yoel Kruger MD

## 2022-03-28 NOTE — ANESTHESIA PROCEDURE NOTES
Airway       Patient location during procedure: OR  Staff -        Performed By: CRNAIndications and Patient Condition       Indications for airway management: kassandra-procedural       Induction type:intravenous       Mask difficulty assessment: 1 - vent by mask    Final Airway Details       Final airway type: endotracheal airway       Successful airway: ETT - single  Endotracheal Airway Details        ETT size (mm): 8.0       Cuffed: yes       Successful intubation technique: video laryngoscopy       VL Blade Size: Glidescope 3       Grade View of Cords: 1       Adjucts: stylet       Position: Left       Measured from: lips       Secured at (cm): 24       Bite block used: None    Post intubation assessment        Placement verified by: capnometry, equal breath sounds and chest rise        Number of attempts at approach: 1       Number of other approaches attempted: 0       Secured with: silk tape       Ease of procedure: easy       Dentition: Intact

## 2022-03-28 NOTE — CONSULTS
[facility] MEDICINE CONSULT NOTE   Physician requesting consult: Otoniel Red*    Reason for consult: Postoperative medical management of medical co-morbidities as below    Assessment and Plan    Dixon Good is a 57 year old old male with a history of essential hypertension, migraine headaches, dumping syndrome status post Nissen fundoplication and history of Covid infection x2 most recently in December 2021 underwent elective cervical laminectomy because of worsening back pain, gait instability and hand numbness and poor coordination [facility] service was asked to evaluate patient for postoperative medical management as follows below. Please resume the home medications as reconciled and further noted below with ordered hold parameters.  Vital signs have been stable post-operatively including hemodynamically stable blood pressure and heart rate. Thank you for this consult; we will continue to follow this patient until discharge.    Migraine headaches  Takes propranolol twice daily for prophylaxis and sumatriptan for treatment  Plan: Continue propranolol with holding parameters    Essential hypertension on losartan  Plan: Continue that with holding parameters and he did not take it last night    Dumping syndrome status post Nissen fundoplication  Nicotine pouches are helpful there 2 mg and he takes typically 2 or 3 a day  Plan: See if pharmacy cannot okay these which are over-the-counter and barcode them and then he can take them as prescribed    Postop cervical laminectomy C4-C6 and C3.  Plan: Per orthopedic surgery team    DVT prophylaxis: SCDs per orthopedic surgical team    Covid status: Covid infection x2 most recently in December 2021 and vaccinated x3    CODE STATUS: Full code    Disposition: Outpatient and per orthopedic surgical team    Estimated Blood Loss:  100 mL    -Reviewed the patient's preoperative H and P and updated missing elements.  -Home medication  reconciliation has been reviewed.  Medications have been ordered as noted from the home list and changes are documented above     HISTORY     Dixon Good is a 57 year old old male with past medical history of dumping syndrome that occurred after Nissen fundoplication where they nicked a nerve and he reports diarrhea intermittently since that time but has had marked improvement with as needed nicotine pouches.  Also has history of migraine headaches for which propranolol has worked well for prophylaxis.  Essential hypertension that has been under control.  He quit smoking cigarettes about 20 years ago after 60-pack-year history.  Currently has neck pain but denies any other symptoms.  No chest pain or shortness of breath or lightheadedness.  Denies any nausea.    Patient had had worsening pain in neck and back with some mild gait instability and mild pain and discoordination and numbness with cervical stenosis as the indications for surgery.    Past Medical History     Patient Active Problem List    Diagnosis Date Noted     Cervical myelopathy (H) 03/28/2022     Priority: Medium        Surgical History   He  has a past surgical history that includes appendectomy; Cholecystectomy; Stomach surgery; Arthroscopy shoulder, open rotator cuff repair, combined (11/20/2013); and vasectomy.     Past Surgical History:   Procedure Laterality Date     APPENDECTOMY       ARTHROSCOPY SHOULDER, OPEN ROTATOR CUFF REPAIR, COMBINED  11/20/2013    Procedure: COMBINED ARTHROSCOPY SHOULDER, OPEN ROTATOR CUFF REPAIR;  Left shoulder arthroscopy mini-open rotator cuff repair ;  Surgeon: Biju Saenz MD;  Location: [facility] OR     CHOLECYSTECTOMY       STOMACH SURGERY       VASECTOMY         Family History    Reviewed, and family history positive for lung cancer in his mother  Social History    Reviewed, and he quit smoking 20 years ago with 60-pack-year history.  5 to 10 ounces of alcohol per week.  He is .  Allergies      Allergies   Allergen Reactions     Flagyl [Metronidazole] Headache     migraines       Prior to Admission Medications      Medications Prior to Admission   Medication Sig Dispense Refill Last Dose     diphenhydrAMINE-acetaminophen (TYLENOL PM)  MG tablet Take 2 tablets by mouth At Bedtime   3/27/2022 at Unknown time     ibuprofen (ADVIL/MOTRIN) 200 MG tablet Take 600 mg by mouth every 6 hours as needed (pain)   3/14/2022     losartan (COZAAR) 100 MG tablet Take 100 mg by mouth every evening   3/26/2022     Melatonin 10 MG TABS tablet Take 30 mg by mouth At Bedtime   3/27/2022 at Unknown time     omeprazole (PRILOSEC) 20 MG DR capsule Take 20 mg by mouth every evening   3/27/2022 at Unknown time     ondansetron (ZOFRAN-ODT) 4 MG ODT tab Take 4 mg by mouth every 8 hours as needed for nausea or vomiting   prn     propranolol (INDERAL) 40 MG tablet Take 40 mg by mouth 2 times daily   3/27/2022 at Unknown time     SUMAtriptan (IMITREX) 50 MG tablet Take 100 mg by mouth at onset of headache for migraine   2/28/2022       Review of Systems   A 12 point comprehensive review of systems was negative except as noted above.    OBJECTIVE         Physical Exam   Temp:  [97.1  F (36.2  C)-98.3  F (36.8  C)] 98.3  F (36.8  C)  Pulse:  [54-75] 65  Resp:  [7-19] 16  BP: (121-160)/(72-89) 121/87  SpO2:  [93 %-100 %] 97 %  Body mass index is 23.68 kg/m .  Alert and appears to be in no apparent distress  Cervical collar in place  Full extraocular movement  Throat is clear with good oral hygiene and teeth are intact  Lungs are clear throughout  Cardiac exam is normal with regular rate and rhythm and no murmur  Abdomen is soft and nontender  Extremities show no edema  Neuro exam shows patient to be alert, cranial nerves II through XII intact, normal sensation in hands and feet.  Normal dorsiflexion and plantarflexion bilaterally and moves all 4 extremities without difficulty    Cardiographics Reviewed Personally By Myself   EKG  "Results: personally reviewed. Normal sinus rhythm, normal EKG    Imaging Reviewed Personally By Myself    Radiology Results:   Recent Results (from the past 24 hour(s))   POC US Guidance Needle Placement    Narrative    Ultrasound was performed as guidance to an anesthesia procedure.  Click   \"PACS images\" hyperlink below to view any stored images.  For specific   procedure details, view procedure note authored by anesthesia.   XR Surgery [place]  Fluoro G/T 5 Min    Narrative    This exam was marked as non-reportable because it will not be read by a   radiologist or a [place] non-radiologist provider.             Labs Reviewed Personally By Myself     Most Recent 3 CBC's:Recent Labs   Lab Test 04/02/19  1523   WBC 6.2   HGB 15.4   MCV 96        Most Recent 3 BMP's:Recent Labs   Lab Test 04/02/19  1523      POTASSIUM 4.1   CHLORIDE 101   CO2 28   BUN 9   CR 0.90   ANIONGAP 11   ABRIL 9.8   GLC 86       Preoperative Labs Reviewed Personally By Myself     Thank you for this consultation.  Appreciate the opportunity to participate in the care of Dixon Good, please feel free to contact us for any questions or concerns.    Kade Barlow MD  [facility]  Phone: #[phone]  "

## 2022-03-28 NOTE — ANESTHESIA CARE TRANSFER NOTE
Patient: Dixon Good    Procedure: Procedure(s):  CERVICAL LAMINOPLASTY CERVICAL 4 - CERVICAL 6,  CERVICAL 3 LAMINECTOMY       Diagnosis: Neck pain [M54.2]  Diagnosis Additional Information: No value filed.    Anesthesia Type:   General     Note:    Oropharynx: oropharynx clear of all foreign objects  Level of Consciousness: awake  Oxygen Supplementation: face mask  Level of Supplemental Oxygen (L/min / FiO2): 10  Independent Airway: airway patency satisfactory and stable  Dentition: dentition unchanged  Vital Signs Stable: post-procedure vital signs reviewed and stable  Report to RN Given: handoff report given  Patient transferred to: PACU    Handoff Report: Identifed the Patient, Identified the Reponsible Provider, Reviewed the pertinent medical history, Discussed the surgical course, Reviewed Intra-OP anesthesia mangement and issues during anesthesia, Set expectations for post-procedure period and Allowed opportunity for questions and acknowledgement of understanding      Vitals:  Vitals Value Taken Time   /86 03/28/22 1152   Temp     Pulse 75 03/28/22 1154   Resp 22 03/28/22 1154   SpO2 100 % 03/28/22 1154   Vitals shown include unvalidated device data.    Electronically Signed By: MUNIR Cole CRNA  March 28, 2022  11:55 AM

## 2022-03-28 NOTE — PROGRESS NOTES
[facility] ACUTE PAIN SERVICE  Date of Admission:  3/28/2022  Physician requesting consult: Dr. Barlow   Reason for consult: post-op pain  Primary Care Physician: Dr. Mejia     Assessment/Plan:     Dixon Good is a 57 year old male who was admitted on 3/28/2022.  Pain team was asked to see the patient for post-op pain. Admitted for planned procedure. History including migraine, chronic diarrhea, HTN, dumping syndrome post Nissen fundoplication, diverticulitis, post-concussion syndrome   Describes pain as  8/10,  aching, sharp at surgical site. Patient reported he stopped taking gabapentin about a week ago, will restart at lower dose, patient may not need previous dose.  Patient reporting he has had bilateral leg pain, and numbness, tingling in hands, the numbness and tingling in hands has improved.  Diet is clear liquid diet. The patient does not smoke and denies chemical dependency history.     Post op day: Day of Surgery. Procedure: C3 laminectomy, C4-C6 laminoplasty, autograft bone grafting.    Opioid Induced Respiratory Depression Risk Assessment:?  Moderate- post-op      Discussed plan with Hilda Morgan CNP, Acute Pain Management Team?     PLAN:   1) Pain is consistent with post-op pain   2)Multimodal Medication Therapy  Topical: none  NSAID'S: defer to surgery, could consider, do not see contraindications.  Muscle Relaxants: none at this time  Adjuvants: Tylenol 975 mg po q 8 h, restart gabapentin at dose of 300 mg po tid.  Antidepressants/anxiolytics: Atarax 25-50 mg po q 4 h prn  Opioids: change oxycodone to po Dilaudid- 2-4 mg po q 3 h prn  IV Pain medication: Dilaudid IV 0.5 mg q 2 h prn, give 0.5 mg IV now.  3)Non-medication interventions: ice  Acupuncture consult, Integrative consult - if patient agreeable   4)Constipation Prophylaxis: senna/docusate 1 po bid, Miralax daily  5) Care Teams: [facility], Ortho  -Opioid prescriber has been none recently.    pulled from system on 03/28/22. This  indicates no recent opioid use. Last opioid Rx 08/19/21 for Vicodin 5/325 #40  0304/22 gabapentin 300 mg #270  02/02/22 gabapentin 300 mg #180  Discharge Recommendations - We recommend prescribing the following at the time of discharge: Most likely oral Dilaudid.      History of Present Illness (HPI):       Dixon Good is a 57 year old old male who presented for planned procedure..  Past medical history as above. The pain is reported to be acute post -op pain.     Per [place]  review, the patient may have some opioid tolerance due to previous opioids in the past.     Past pain treatments have included: Pain Clinic-no      Home pain medications/psych medications/anticoagulation medications include: gabapentin, ibuprofen, sumatriptan    Jasmin Gray [facility]  Acute Care Pain Management Program  [facility] ([place], [place], [place])  Monday-Friday 8a-4p   Page via online paging system or call [phone]

## 2022-03-28 NOTE — OP NOTE
Orthopedic  Operative Note    Pre-operative diagnosis: Cervical myelopathy    Post-operative diagnosis: Cervical myelopathy    Procedure: 1.  C3 laminectomy   2.  C4-C6 laminoplasty   3.  Autograft bone grafting    Surgeon: Otoniel Red MD    Assistant(s): Marcie Guillen PA-C. is an experienced first surgical assistant whose assistance was necessary for patient positioning, hemostasis, soft tissue and neural retraction, closure, and safe progression of surgery.      Anesthesia: General endotracheal anesthesia    Estimated blood loss: 100 mL     Drains: Hemovac    Specimens: None    Indications:                               The patient is a very pleasant 57-year-old gentleman who presented my office with mild myelopathy as well as severe mid to lower thoracic pain that was likely secondary to a old compression fracture.  During the course of imaging for that issue, high discovered cervical stenosis.  The patient was noting some slight reduction in manual dexterity, as well as hand numbness, in addition to some very low-grade gait instability.  I counseled the patient on operative versus nonoperative management for this.  Given that he felt he had progressed lately, and that he leads a very active lifestyle with a lot of high-impact activities, he felt very strongly that he wished for a surgical decompression.  He did not have any neck pain, and though he had straightening of the cervical lordosis, he was not frankly kyphotic.  Therefore, I counseled the patient that a laminoplasty would be a reasonable option for him to match his lifestyle needs and goals.    I again reviewed the operative indications, the general and specific risks, benefits, and rehabilitation issues. Details of the procedure and postoperative recovery is highlighted. Patient and attending family appear to understand the issues and express their consent proceed.     Findings: No significant finding    Complications: None     Procedure  Detail: The patient was evaluated in the preoperative holding area.  He was given the opportunity ask questions regarding the procedure in detail, and provided informed consent to proceed.  The patient's posterior cervical spine was marked with the surgeons initial, and the patient was transported back to the operative suite on a gurney.  There, he was inducted under general anesthesia by our anesthesia colleagues.  A Howell clamp was placed on his head, and the patient was placed prone onto a four-point Tj frame.  The bony prominences were padded, and the arms were papoosed behind his back.  The shoulders were taped down, and the neck was prepped and draped in the typical sterile fashion.  A preprocedural pause was performed to identify the correct patient, laterality, and procedure to be performed.    I then obtained a crosstable lateral x-ray to approximate appropriate skin incision area.  I preinjected with 0.25 Marcaine with epinephrine, and incised the skin using a 10 blade.  I dissected down through skin using the avascular midline plane using electrocautery.  I cleared the tissue subperiosteally off either side of the spinous processes and reach the lamina.  There, using a [place]oc[place] and a [place], I obtained a crosstable lateral to confirm appropriate location.  This showed that the Kocher was on C4 and the [place] was in the C4-5 joint.  This spot was marked using ink on the spinous process which provided an indelible sesar.  This provided our secondary pause.  Once this had been completed, I carried out the rest my dissection.  On the left side I dissected to the medial edge of the facet joints.  On the right side I dissected into the facet joint slightly to ensure adequate positioning of the plates.  Once this dissection was completed, I placed Doty retractors for visualization, and brought in the operative microscope.    I cleared the interspinous material from the C4-5 and the C6-7 interval.  I  then used a up-biting curette to gently mobilize the ligamentum, and used a 1 Kerrison to incise the ligamentum across the interspace.  I then used a high-speed matchstick bur to make a trough down to ligamentum on either side of the C3 spinous lamina.  Once the lamina was mobile, I applied gentle upward pressure using a [place], and used a 1 Kerrison to resect the remaining ligamentum.  This freed to the C3 lamina, which was then ground-up for bone graft.  I then completed the trough on the patient's right side through the thickness of the lamina of C4, C5, and C6.  I then turned my attention to the left side, where I notched the inferior and superior aspects of each of those respective lamina, and made a trough just through the outer cortex.  I then tested each level for mobility, and found that all 3 greenstick is quite easily.  I then turned my attention to placing the plates.  My assistant maintained upward retraction on each lamina, and starting with the C4 lamina I affixed a laminoplasty plate.  Medially, I used 5 mm screws and laterally I used 7 mm screws.  Several screws did not obtain good purchase and were left out, but each level had 3 excellent screws, and had achieved great bony stability.  I then copiously irrigated the wound and packed the left trough with morselized bone graft to assist with greenstick fracture healing.  I then placed 1 g of vancomycin powder into the wound, and placed a deep drain.  The fascia was closed using 1 Vicryl, the subcuticular layers were closed using 2-0 Vicryl, and the skin was closed using a 3-O strata fix.  The skin was covered with skin glue.  The patient tolerated the procedure without any complications, and all sponge and needle counts were correct at the end.    Condition: Stable     Weight bearing status: Weight bearing as tolerated     Activity:      Anticoagulation plan:    Plan:      Otoniel Red MD  [facility]  Date:  3/28/2022  11:18 AM    Activity as tolerated  Patient may move about with assist as indicated or with supervision    Ambulation and mechanical prophylaxis.    The patient will mobilize with physical therapy using a soft cervical collar on a as needed basis.  Once he clears drain output and PT criteria, he will discharge home.  I will follow up with him in 2 weeks for a follow-up visit.

## 2022-03-28 NOTE — PLAN OF CARE
Patient vital signs are at baseline: Yes  Patient able to ambulate as they were prior to admission or with assist devices provided by therapies during their stay:  No,  Reason: Due to ambulate.   Patient MUST void prior to discharge:  No,  Reason: Lechuga removed, due to void.   Patient able to tolerate oral intake:  Yes  Pain has adequate pain control using Oral analgesics:  No,  Reason: PRN IV Dilaudid administered  Does patient have an identified :  Yes  Has goal D/C date and time been discussed with patient:  Yes    Patient received PRN IV Dilaudid and scheduled Tylenol for elevated neck pain. Dressing is c/d/i, hemovac drain patent and draining bloody output. Lechuga removed, due to void (following bladder management protocol). Denies numbness and tingling in extremities. VSS, wife at bedside visiting.

## 2022-03-28 NOTE — ANESTHESIA POSTPROCEDURE EVALUATION
Patient: Dixon Good    Procedure: Procedure(s):  CERVICAL LAMINOPLASTY CERVICAL 4 - CERVICAL 6,  CERVICAL 3 LAMINECTOMY       Anesthesia Type:  General    Note:     Postop Pain Control: Uneventful            Sign Out: Well controlled pain   PONV: No   Neuro/Psych: Uneventful            Sign Out: Acceptable/Baseline neuro status   Airway/Respiratory: Uneventful            Sign Out: Acceptable/Baseline resp. status   CV/Hemodynamics: Uneventful            Sign Out: Acceptable CV status; No obvious hypovolemia; No obvious fluid overload   Other NRE: NONE   DID A NON-ROUTINE EVENT OCCUR? No           Last vitals:  Vitals Value Taken Time   /81 03/28/22 1250   Temp 36.4  C (97.5  F) 03/28/22 1300   Pulse 66 03/28/22 1300   Resp 11 03/28/22 1256   SpO2 96 % 03/28/22 1300   Vitals shown include unvalidated device data.    Electronically Signed By: Yoel Kruger MD  March 28, 2022  1:00 PM

## 2022-03-29 ENCOUNTER — APPOINTMENT (OUTPATIENT)
Dept: RADIOLOGY | Facility: CLINIC | Age: 57
End: 2022-03-29
Attending: NURSE PRACTITIONER
Payer: COMMERCIAL

## 2022-03-29 ENCOUNTER — APPOINTMENT (OUTPATIENT)
Dept: PHYSICAL THERAPY | Facility: CLINIC | Age: 57
End: 2022-03-29
Attending: ORTHOPAEDIC SURGERY
Payer: COMMERCIAL

## 2022-03-29 VITALS
TEMPERATURE: 98 F | BODY MASS INDEX: 23.62 KG/M2 | OXYGEN SATURATION: 96 % | HEART RATE: 61 BPM | RESPIRATION RATE: 16 BRPM | HEIGHT: 70 IN | DIASTOLIC BLOOD PRESSURE: 67 MMHG | SYSTOLIC BLOOD PRESSURE: 105 MMHG | WEIGHT: 165 LBS

## 2022-03-29 LAB
ANION GAP SERPL CALCULATED.3IONS-SCNC: 6 MMOL/L (ref 5–18)
BUN SERPL-MCNC: 8 MG/DL (ref 8–22)
CALCIUM SERPL-MCNC: 7.9 MG/DL (ref 8.5–10.5)
CHLORIDE BLD-SCNC: 110 MMOL/L (ref 98–107)
CO2 SERPL-SCNC: 22 MMOL/L (ref 22–31)
CREAT SERPL-MCNC: 0.82 MG/DL (ref 0.7–1.3)
GFR SERPL CREATININE-BSD FRML MDRD: >90 ML/MIN/1.73M2
GLUCOSE BLD-MCNC: 119 MG/DL (ref 70–125)
HGB BLD-MCNC: 11.9 G/DL (ref 13.3–17.7)
POTASSIUM BLD-SCNC: 3.9 MMOL/L (ref 3.5–5)
SODIUM SERPL-SCNC: 138 MMOL/L (ref 136–145)

## 2022-03-29 PROCEDURE — 85018 HEMOGLOBIN: CPT | Performed by: ORTHOPAEDIC SURGERY

## 2022-03-29 PROCEDURE — 250N000013 HC RX MED GY IP 250 OP 250 PS 637: Performed by: NURSE PRACTITIONER

## 2022-03-29 PROCEDURE — 80048 BASIC METABOLIC PNL TOTAL CA: CPT | Performed by: ORTHOPAEDIC SURGERY

## 2022-03-29 PROCEDURE — 250N000013 HC RX MED GY IP 250 OP 250 PS 637: Performed by: ORTHOPAEDIC SURGERY

## 2022-03-29 PROCEDURE — 250N000013 HC RX MED GY IP 250 OP 250 PS 637: Performed by: FAMILY MEDICINE

## 2022-03-29 PROCEDURE — 36415 COLL VENOUS BLD VENIPUNCTURE: CPT | Performed by: ORTHOPAEDIC SURGERY

## 2022-03-29 PROCEDURE — 97110 THERAPEUTIC EXERCISES: CPT | Mod: GP

## 2022-03-29 PROCEDURE — 97116 GAIT TRAINING THERAPY: CPT | Mod: GP

## 2022-03-29 PROCEDURE — 999N000065 XR CERVICAL SPINE 2/3 VWS

## 2022-03-29 PROCEDURE — 97162 PT EVAL MOD COMPLEX 30 MIN: CPT | Mod: GP

## 2022-03-29 PROCEDURE — 250N000011 HC RX IP 250 OP 636: Performed by: ORTHOPAEDIC SURGERY

## 2022-03-29 RX ORDER — GABAPENTIN 300 MG/1
300 CAPSULE ORAL 3 TIMES DAILY PRN
Start: 2022-03-29 | End: 2022-12-02

## 2022-03-29 RX ORDER — AMOXICILLIN 250 MG
1 CAPSULE ORAL 2 TIMES DAILY
Status: ON HOLD | COMMUNITY
Start: 2022-03-29 | End: 2022-12-05

## 2022-03-29 RX ORDER — ACETAMINOPHEN 325 MG/1
975 TABLET ORAL EVERY 8 HOURS
Status: ON HOLD | COMMUNITY
Start: 2022-03-29 | End: 2022-12-05

## 2022-03-29 RX ORDER — HYDROMORPHONE HYDROCHLORIDE 2 MG/1
2-4 TABLET ORAL
Qty: 20 TABLET | Refills: 0 | Status: SHIPPED | OUTPATIENT
Start: 2022-03-29 | End: 2022-12-02

## 2022-03-29 RX ORDER — HYDROXYZINE HYDROCHLORIDE 25 MG/1
25-50 TABLET, FILM COATED ORAL EVERY 4 HOURS PRN
Qty: 25 TABLET | Refills: 0 | Status: ON HOLD | OUTPATIENT
Start: 2022-03-29 | End: 2022-12-05

## 2022-03-29 RX ORDER — POLYETHYLENE GLYCOL 3350 17 G/17G
17 POWDER, FOR SOLUTION ORAL DAILY
Qty: 510 G | Status: ON HOLD | COMMUNITY
Start: 2022-03-29 | End: 2022-12-05

## 2022-03-29 RX ORDER — IBUPROFEN 200 MG
600 TABLET ORAL EVERY 6 HOURS PRN
Status: ON HOLD | COMMUNITY
Start: 2022-04-02 | End: 2022-12-05

## 2022-03-29 RX ADMIN — HYDROMORPHONE HYDROCHLORIDE 4 MG: 2 TABLET ORAL at 12:46

## 2022-03-29 RX ADMIN — HYDROMORPHONE HYDROCHLORIDE 4 MG: 2 TABLET ORAL at 09:32

## 2022-03-29 RX ADMIN — PROPRANOLOL HYDROCHLORIDE 40 MG: 40 TABLET ORAL at 08:02

## 2022-03-29 RX ADMIN — HYDROXYZINE HYDROCHLORIDE 50 MG: 25 TABLET ORAL at 09:33

## 2022-03-29 RX ADMIN — GABAPENTIN 300 MG: 300 CAPSULE ORAL at 08:02

## 2022-03-29 RX ADMIN — THERA TABS 1 TABLET: TAB at 08:02

## 2022-03-29 RX ADMIN — ACETAMINOPHEN 975 MG: 325 TABLET ORAL at 06:27

## 2022-03-29 RX ADMIN — HYDROMORPHONE HYDROCHLORIDE 4 MG: 2 TABLET ORAL at 00:25

## 2022-03-29 RX ADMIN — CEFAZOLIN 1 G: 1 INJECTION, POWDER, FOR SOLUTION INTRAMUSCULAR; INTRAVENOUS at 03:30

## 2022-03-29 RX ADMIN — HYDROMORPHONE HYDROCHLORIDE 4 MG: 2 TABLET ORAL at 06:27

## 2022-03-29 RX ADMIN — HYDROMORPHONE HYDROCHLORIDE 4 MG: 2 TABLET ORAL at 03:30

## 2022-03-29 NOTE — PROGRESS NOTES
"[facility] Orthopedics   Spine Progress Note - Cervical    Post-operative Day: 1 Day Post-Op    Procedure(s):  CERVICAL LAMINOPLASTY CERVICAL 4 - CERVICAL 6,  CERVICAL 3 LAMINECTOMY    Plan: Anticoagulation protocol:    Mechanical and/or ambulation           Pain medications:  Oral dilaudid, hydroxyzine, gabapentin, tylenol            Weight bearing status:  WBAT, no lifting greater than 5 lbs.   Acute Blood Loss Anemia: stable, dropping only 1.5 points from baseline, non symptomatic.              Disposition:home this morning after therapies             Continue cares and therapy. Soft cervical collar for comfort.    Subjective:  Dixon reports feeling generally well today. He states, \"I haven't walked much, so I dont know about the instability feeling, but the pains I've been having shooting down my legs over the last year are gone.\"   Pain: neck  Pain location: posterior neck  Chest pain, SOB:  none  Nausea/vomiting: none  Neuro:  No numbness, tingling, or weakness reported in bilateral UE    Objective:    Vital signs in last 24 hours  Temp:  [97.5  F (36.4  C)-98.8  F (37.1  C)] 98  F (36.7  C)  Pulse:  [60-75] 61  Resp:  [7-19] 16  BP: ()/(61-89) 105/67  SpO2:  [93 %-100 %] 96 %  Wt Readings from Last 1 Encounters:   03/28/22 74.8 kg (165 lb)     Temp Readings from Last 1 Encounters:   03/29/22 98  F (36.7  C) (Oral)     BP Readings from Last 1 Encounters:   03/29/22 105/67     Pulse Readings from Last 1 Encounters:   03/29/22 61       Wound status: incisions are clean dry and intact. No, inferior portion of dressing is saturated, but will be changed with dressing change and drain removal.    Circulation: Cap Refill <2 sec bilaterally.  Motor:  5/5 strength wrist/finger flexors and extensors bilaterally .  Sensation: Intact sensation bilateral UE C5-T1.  No dysphagia or dysphonia.  Calf tenderness: bilateral  none    Pertinent Labs     Lab Results: personally reviewed.   Lab Results   Component Value " Date    WBC 6.2 04/02/2019    HGB 11.9 (L) 03/29/2022    HCT 46.3 04/02/2019    MCV 96 04/02/2019     04/02/2019     No results for input(s): INR in the last 168 hours.       Report completed by:  Ace Jones NP  Date: 3/29/2022

## 2022-03-29 NOTE — PROGRESS NOTES
Physical Therapy Discharge Summary    Reason for therapy discharge:    All goals and outcomes met, no further needs identified.    Progress towards therapy goal(s). See goals on Care Plan in [facility] electronic health record for goal details.  Goals met    Therapy recommendation(s):    Continue home exercise program.   Continue home ambulation program.

## 2022-03-29 NOTE — CARE PLAN
OT orders received and chart review completed. Per PT, patient does not have IPOT needs. Will complete orders.

## 2022-03-29 NOTE — PROGRESS NOTES
03/29/22 0930   Quick Adds   Type of Visit Initial PT Evaluation   Living Environment   People in Home spouse   Current Living Arrangements house   Home Accessibility stairs to enter home   Number of Stairs, Main Entrance none   Living Environment Comments Is set up to stay on main level   Self-Care   Usual Activity Tolerance moderate   Current Activity Tolerance good   Equipment Currently Used at Home cane, straight   Activity/Exercise/Self-Care Comment Pt has been independent with all functional mobility at baseline. Has used cane at times.   General Information   Onset of Illness/Injury or Date of Surgery 03/28/22   Referring Physician Dr Otoniel Red   Patient/Family Therapy Goals Statement (PT) Get strength back.   Pertinent History of Current Problem (include personal factors and/or comorbidities that impact the POC) Admit for C4-6 lami due to cervical myelopathy   Existing Precautions/Restrictions spinal  (cervical)   Pain Assessment   Patient Currently in Pain No   Bed Mobility   Comment, (Bed Mobility) Sit<>supine independent.   Transfers   Comment, (Transfers) Sit<>stand from edge of bed to stand without AD independent.   Gait/Stairs (Locomotion)   [place] Level (Gait) independent;verbal cues   Assistive Device (Gait) other (see comments)  (none)   Distance in Feet (Required for LE Total Joints) 350'x1   Pattern (Gait) swing-through   Comment, (Gait/Stairs) Up/down 4 steps with one rail SBA with cues for sequence and safety.   Clinical Impression   Criteria for Skilled Therapeutic Intervention Yes, treatment indicated   PT Diagnosis (PT) Impaired functional mobility   Influenced by the following impairments weakness   Functional limitations due to impairments gait, stairs   Clinical Presentation (PT Evaluation Complexity) Stable/Uncomplicated   Clinical Presentation Rationale Presents as medically diagnosed   Clinical Decision Making (Complexity) low complexity   Planned Therapy Interventions  (PT) bed mobility training;gait training;home exercise program;stair training;transfer training   Anticipated Equipment Needs at Discharge (PT) other (see comments)  (none)   Risk & Benefits of therapy have been explained evaluation/treatment results reviewed;care plan/treatment goals reviewed;participants voiced agreement with care plan;participants included;patient;spouse/significant other   PT Discharge Planning   PT Discharge Recommendation (DC Rec) home with assist   PT Rationale for DC Rec Assist from spouse as needed.   Plan of Care Review   Plan of Care Reviewed With patient;spouse

## 2022-03-29 NOTE — PLAN OF CARE
Goal Outcome Evaluation:      Pt is alert & pleasant & able to make needs known. Pt had surgery on his cervical vertebrae, getting a laminectomy of C 4 through C 6.  Pt is wearing a cervical collar and has a J/P drain coming out from under the collar. I did not remove the collar to examine the dressing & there was no evidence of bleeding or other drainage leaking down the back of pt's neck. Pt has consumed liquids as well as solid food. He's also ambulated in the hallway and voided 3 times. Pt anticipates discharging home tomorrow following therapy.

## 2022-03-29 NOTE — PROGRESS NOTES
Will not see today:  PAIN MANAGEMENT SERVICE CHART CHECK    This patient's chart has been reviewed by the Pain Service. It has been determined that no change is necessary to the pain management regimen at this time. Pans for discharge. Discussed with Orthopedics. Pain team will sign off. If you would like the patient to be seen, please contact the service and ask to have the patient seen.    Thank you!      Hilda AMARAL, FNP-C  Acute Care Pain Management Program  [facility] ([place], [place], [place])  Monday-Friday 8a-4p   Page via online paging system  or call [phone]

## 2022-03-29 NOTE — DISCHARGE SUMMARY
[facility] Orthopedics Discharge Summary                                  [facility]     [address] [address] [id number]   Age: 57 year old  PCP: Citlalli Mejia, [phone] 1965     Date of Admission:  3/28/2022  Date of Discharge::  3/29/2022  Discharge Provider:  Ace Jones NP    Code status:  Full Code    Admission Information:  Admission Diagnosis:  Neck pain [M54.2]  Cervical myelopathy (H) [G95.9]    Post-Operative Day: 1 Day Post-Op     Reason for admission:  The patient was admitted for the following:Procedure(s) (LRB):  CERVICAL LAMINOPLASTY CERVICAL 4 - CERVICAL 6, (N/A)  CERVICAL 3 LAMINECTOMY (N/A)    Active Problems:    Cervical myelopathy (H)      Allergies:  Flagyl [metronidazole]    Following the procedure noted above the patient was transferred to the post-op floor and started on:    Therapy:  physical therapy and occupational therapy  Anticoagulation Plan: scoanticoagulationlist2: Mechanical and/or ambulation   Pain Management: scopainmedication: oxycodone, dilaudid, tylenol, vistaril and gabapentin. Patient is hoping to reduce his gabapentin dose, but may use the previous 300mg TID during immediate post operative period.   Weight bearing status: Weight bearing as tolerated, do not lift greater than 5 lbs x 2 weeks.      The patient was followed by Orthopedics during the inpatient treatment course:  Complications:  None  Additional consultations:  [facility]. Appreciate their consultation.      Pertinent Labs   Lab Results: personally reviewed.     Recent Labs   Lab Test 03/29/22  0607 04/02/19  1523   HGB 11.9* 15.4   HCT  --  46.3   MCV  --  96   PLT  --  319    140   CRP  --  2.4          Discharge Information:  Condition at discharge: Stable  Discharge destination:  Discharged to home     Medications at discharge:  Current Discharge Medication List        START taking these medications    Details   acetaminophen (TYLENOL) 325 MG tablet Take 3 tablets  (975 mg) by mouth every 8 hours    Comments: Take for the next 7 to 10 days as prescribed to provide you with optimal pain control. Alternate with your pain medication for better relief.      gabapentin (NEURONTIN) 300 MG capsule Take 1 capsule (300 mg) by mouth 3 times daily as needed for neuropathic pain May reduce to two doses per day instead of three if neuropathic pain is improving.      HYDROmorphone (DILAUDID) 2 MG tablet Take 1-2 tablets (2-4 mg) by mouth every 3 hours as needed for moderate to severe pain Take 1 pill for moderate pain (4-6/10) and 2 pills for severe pain (7-10/10). Alternate with Tylenol.  Qty: 20 tablet, Refills: 0      hydrOXYzine (ATARAX) 25 MG tablet Take 1-2 tablets (25-50 mg) by mouth every 4 hours as needed for other (Pain adjuvant)  Qty: 25 tablet, Refills: 0      polyethylene glycol (MIRALAX) 17 GM/Dose powder Take 17 g by mouth daily Take while taking narcotics medications and until bowels are back to normal routine. Hold for loose stools  Qty: 510 g      senna-docusate (SENOKOT-S/PERICOLACE) 8.6-50 MG tablet Take 1 tablet by mouth 2 times daily Take while taking narcotics medications and until bowels are back to normal routine. Hold for loose stools           CONTINUE these medications which have CHANGED    Details   ibuprofen (ADVIL/MOTRIN) 200 MG tablet Take 3 tablets (600 mg) by mouth every 6 hours as needed (pain) May resume 5 days after surgery.           CONTINUE these medications which have NOT CHANGED    Details   diphenhydrAMINE-acetaminophen (TYLENOL PM)  MG tablet Take 2 tablets by mouth At Bedtime      losartan (COZAAR) 100 MG tablet Take 100 mg by mouth every evening      Melatonin 10 MG TABS tablet Take 30 mg by mouth At Bedtime      omeprazole (PRILOSEC) 20 MG DR capsule Take 20 mg by mouth every evening      ondansetron (ZOFRAN-ODT) 4 MG ODT tab Take 4 mg by mouth every 8 hours as needed for nausea or vomiting      propranolol (INDERAL) 40 MG tablet Take 40  mg by mouth 2 times daily      SUMAtriptan (IMITREX) 50 MG tablet Take 100 mg by mouth at onset of headache for migraine                          Follow-Up Care:  Patient should be seen in the office in 10-14 days by the Orthopedic Surgeon/Physician Assistant.  Call [phone] for appointment or questions.    It was my pleasure to take care of the above patient.  Ace Jones NP

## 2022-03-29 NOTE — PLAN OF CARE
Goal Outcome Evaluation:      A/Ox4, very pleasant. VSS on room air. IV running Ns@125. Urinal voiding, bladder scans within normal limits. Hemovac in place. Dried drainage on cervical surgical dressing. PO dilaudid in use for pain control.     Patient vital signs are at baseline: Yes  Patient able to ambulate as they were prior to admission or with assist devices provided by therapies during their stay:  Yes  Patient MUST void prior to discharge:  Yes  Patient able to tolerate oral intake:  Yes  Pain has adequate pain control using Oral analgesics:  Yes  Does patient have an identified :   Has goal D/C date and time been discussed with patient:  Yes

## 2022-09-13 ENCOUNTER — TRANSFERRED RECORDS (OUTPATIENT)
Dept: HEALTH INFORMATION MANAGEMENT | Facility: CLINIC | Age: 57
End: 2022-09-13

## 2022-11-15 ENCOUNTER — TRANSFERRED RECORDS (OUTPATIENT)
Dept: MULTI SPECIALTY CLINIC | Facility: CLINIC | Age: 57
End: 2022-11-15

## 2022-11-15 LAB
CREATININE (EXTERNAL): 0.93 MG/DL (ref 0.72–1.25)
GFR ESTIMATED (EXTERNAL): >90 ML/MIN/1.73M2
GLUCOSE (EXTERNAL): 87 MG/DL (ref 70–100)
POTASSIUM (EXTERNAL): 4.3 MMOL/L (ref 3.5–5.1)

## 2022-12-03 ENCOUNTER — ANESTHESIA EVENT (OUTPATIENT)
Dept: SURGERY | Facility: CLINIC | Age: 57
DRG: 458 | End: 2022-12-03
Payer: COMMERCIAL

## 2022-12-05 ENCOUNTER — ANESTHESIA (OUTPATIENT)
Dept: SURGERY | Facility: CLINIC | Age: 57
DRG: 458 | End: 2022-12-05
Payer: COMMERCIAL

## 2022-12-05 ENCOUNTER — DOCUMENTATION ONLY (OUTPATIENT)
Dept: ORTHOPEDICS | Facility: CLINIC | Age: 57
End: 2022-12-05

## 2022-12-05 ENCOUNTER — APPOINTMENT (OUTPATIENT)
Dept: RADIOLOGY | Facility: CLINIC | Age: 57
DRG: 458 | End: 2022-12-05
Attending: ORTHOPAEDIC SURGERY
Payer: COMMERCIAL

## 2022-12-05 ENCOUNTER — APPOINTMENT (OUTPATIENT)
Dept: PHYSICAL THERAPY | Facility: CLINIC | Age: 57
DRG: 458 | End: 2022-12-05
Attending: ORTHOPAEDIC SURGERY
Payer: COMMERCIAL

## 2022-12-05 ENCOUNTER — HOSPITAL ENCOUNTER (INPATIENT)
Facility: CLINIC | Age: 57
LOS: 1 days | Discharge: HOME | DRG: 458 | End: 2022-12-07
Attending: ORTHOPAEDIC SURGERY | Admitting: ORTHOPAEDIC SURGERY
Payer: COMMERCIAL

## 2022-12-05 DIAGNOSIS — Z98.1 S/P FUSION OF THORACIC SPINE: Primary | ICD-10-CM

## 2022-12-05 PROCEDURE — 250N000013 HC RX MED GY IP 250 OP 250 PS 637: Performed by: PHYSICIAN ASSISTANT

## 2022-12-05 PROCEDURE — 258N000003 HC RX IP 258 OP 636: Performed by: ANESTHESIOLOGY

## 2022-12-05 PROCEDURE — C1762 CONN TISS, HUMAN(INC FASCIA): HCPCS | Performed by: ORTHOPAEDIC SURGERY

## 2022-12-05 PROCEDURE — 250N000013 HC RX MED GY IP 250 OP 250 PS 637: Performed by: ANESTHESIOLOGY

## 2022-12-05 PROCEDURE — 250N000013 HC RX MED GY IP 250 OP 250 PS 637: Performed by: ORTHOPAEDIC SURGERY

## 2022-12-05 PROCEDURE — 999N000180 XR SURGERY CARM FLUORO LESS THAN 5 MIN: Mod: TC

## 2022-12-05 PROCEDURE — C1713 ANCHOR/SCREW BN/BN,TIS/BN: HCPCS | Performed by: ORTHOPAEDIC SURGERY

## 2022-12-05 PROCEDURE — 97530 THERAPEUTIC ACTIVITIES: CPT | Mod: GP

## 2022-12-05 PROCEDURE — 272N000004 HC RX 272: Performed by: ORTHOPAEDIC SURGERY

## 2022-12-05 PROCEDURE — 250N000011 HC RX IP 250 OP 636: Performed by: ANESTHESIOLOGY

## 2022-12-05 PROCEDURE — 250N000005 HC OR RX SURGIFLO HEMOSTATIC MATRIX 10ML 199102S OPNP: Performed by: ORTHOPAEDIC SURGERY

## 2022-12-05 PROCEDURE — 97116 GAIT TRAINING THERAPY: CPT | Mod: GP

## 2022-12-05 PROCEDURE — 0RGA071 FUSION OF THORACOLUMBAR VERTEBRAL JOINT WITH AUTOLOGOUS TISSUE SUBSTITUTE, POSTERIOR APPROACH, POSTERIOR COLUMN, OPEN APPROACH: ICD-10-PCS | Performed by: ORTHOPAEDIC SURGERY

## 2022-12-05 PROCEDURE — 250N000011 HC RX IP 250 OP 636: Performed by: PHYSICIAN ASSISTANT

## 2022-12-05 PROCEDURE — 250N000009 HC RX 250: Performed by: ANESTHESIOLOGY

## 2022-12-05 PROCEDURE — 710N000010 HC RECOVERY PHASE 1, LEVEL 2, PER MIN: Performed by: ORTHOPAEDIC SURGERY

## 2022-12-05 PROCEDURE — 97161 PT EVAL LOW COMPLEX 20 MIN: CPT | Mod: GP

## 2022-12-05 PROCEDURE — 258N000003 HC RX IP 258 OP 636: Performed by: ORTHOPAEDIC SURGERY

## 2022-12-05 PROCEDURE — 250N000011 HC RX IP 250 OP 636: Performed by: NURSE ANESTHETIST, CERTIFIED REGISTERED

## 2022-12-05 PROCEDURE — 999N000141 HC STATISTIC PRE-PROCEDURE NURSING ASSESSMENT: Performed by: ORTHOPAEDIC SURGERY

## 2022-12-05 PROCEDURE — 8E0WXBZ COMPUTER ASSISTED PROCEDURE OF TRUNK REGION: ICD-10-PCS | Performed by: ORTHOPAEDIC SURGERY

## 2022-12-05 PROCEDURE — 250N000011 HC RX IP 250 OP 636: Performed by: ORTHOPAEDIC SURGERY

## 2022-12-05 PROCEDURE — 360N000085 HC SURGERY LEVEL 5 W/ FLUORO, PER MIN: Performed by: ORTHOPAEDIC SURGERY

## 2022-12-05 PROCEDURE — 370N000017 HC ANESTHESIA TECHNICAL FEE, PER MIN: Performed by: ORTHOPAEDIC SURGERY

## 2022-12-05 PROCEDURE — 250N000009 HC RX 250: Performed by: NURSE ANESTHETIST, CERTIFIED REGISTERED

## 2022-12-05 PROCEDURE — 4A11X4G MONITORING OF PERIPHERAL NERVOUS ELECTRICAL ACTIVITY, INTRAOPERATIVE, EXTERNAL APPROACH: ICD-10-PCS | Performed by: ORTHOPAEDIC SURGERY

## 2022-12-05 PROCEDURE — 272N000001 HC OR GENERAL SUPPLY STERILE: Performed by: ORTHOPAEDIC SURGERY

## 2022-12-05 PROCEDURE — 0SG0071 FUSION OF LUMBAR VERTEBRAL JOINT WITH AUTOLOGOUS TISSUE SUBSTITUTE, POSTERIOR APPROACH, POSTERIOR COLUMN, OPEN APPROACH: ICD-10-PCS | Performed by: ORTHOPAEDIC SURGERY

## 2022-12-05 PROCEDURE — 250N000009 HC RX 250: Performed by: ORTHOPAEDIC SURGERY

## 2022-12-05 PROCEDURE — 250N000025 HC SEVOFLURANE, PER MIN: Performed by: ORTHOPAEDIC SURGERY

## 2022-12-05 DEVICE — IMP ROD MEDT COCR-MOLYBDM CVD LVL1 4.75X90MM 1475501090: Type: IMPLANTABLE DEVICE | Site: SPINE THORACIC | Status: FUNCTIONAL

## 2022-12-05 DEVICE — DBM T42210 2.5CMX5CM 2 EACH GRAFTON MATR
Type: IMPLANTABLE DEVICE | Site: SPINE THORACIC | Status: FUNCTIONAL
Brand: GRAFTON®AND GRAFTON PLUS®DEMINERALIZED BONE MATRIX (DBM)

## 2022-12-05 DEVICE — IMP ROD MEDT 8CM 1475501080: Type: IMPLANTABLE DEVICE | Site: SPINE THORACIC | Status: FUNCTIONAL

## 2022-12-05 DEVICE — IMP SCR MEDT BREAK-OFF SET SOLERA 4.75MM TI 5440030: Type: IMPLANTABLE DEVICE | Site: SPINE THORACIC | Status: FUNCTIONAL

## 2022-12-05 DEVICE — GRAFT BN CANC 15CC CRSH 1-10MM 800103: Type: IMPLANTABLE DEVICE | Site: SPINE THORACIC | Status: FUNCTIONAL

## 2022-12-05 DEVICE — IMP SCR MEDT 4.75MM SOLERA 5.5X55MM MA 54840005555: Type: IMPLANTABLE DEVICE | Site: SPINE THORACIC | Status: FUNCTIONAL

## 2022-12-05 DEVICE — IMP SCR MEDT 4.75MM SOLERA 6.5X55MM MA 54840006555: Type: IMPLANTABLE DEVICE | Site: SPINE THORACIC | Status: FUNCTIONAL

## 2022-12-05 DEVICE — IMP SCR MEDT 4.75MM SOLERA 6.5X50MM MA 54840006550: Type: IMPLANTABLE DEVICE | Site: SPINE THORACIC | Status: FUNCTIONAL

## 2022-12-05 RX ORDER — FENTANYL CITRATE 50 UG/ML
50 INJECTION, SOLUTION INTRAMUSCULAR; INTRAVENOUS EVERY 5 MIN PRN
Status: DISCONTINUED | OUTPATIENT
Start: 2022-12-05 | End: 2022-12-05 | Stop reason: HOSPADM

## 2022-12-05 RX ORDER — ONDANSETRON 2 MG/ML
4 INJECTION INTRAMUSCULAR; INTRAVENOUS EVERY 6 HOURS PRN
Status: DISCONTINUED | OUTPATIENT
Start: 2022-12-05 | End: 2022-12-07 | Stop reason: HOSPADM

## 2022-12-05 RX ORDER — OXYCODONE HYDROCHLORIDE 5 MG/1
10 TABLET ORAL EVERY 4 HOURS PRN
Status: DISCONTINUED | OUTPATIENT
Start: 2022-12-05 | End: 2022-12-05

## 2022-12-05 RX ORDER — ONDANSETRON 2 MG/ML
INJECTION INTRAMUSCULAR; INTRAVENOUS PRN
Status: DISCONTINUED | OUTPATIENT
Start: 2022-12-05 | End: 2022-12-05

## 2022-12-05 RX ORDER — CETIRIZINE HYDROCHLORIDE 10 MG/1
10 TABLET ORAL DAILY
Status: DISCONTINUED | OUTPATIENT
Start: 2022-12-05 | End: 2022-12-07 | Stop reason: HOSPADM

## 2022-12-05 RX ORDER — PROPRANOLOL HYDROCHLORIDE 20 MG/1
40 TABLET ORAL ONCE
Status: COMPLETED | OUTPATIENT
Start: 2022-12-05 | End: 2022-12-05

## 2022-12-05 RX ORDER — HYDROMORPHONE HCL IN WATER/PF 6 MG/30 ML
0.4 PATIENT CONTROLLED ANALGESIA SYRINGE INTRAVENOUS
Status: DISCONTINUED | OUTPATIENT
Start: 2022-12-05 | End: 2022-12-07 | Stop reason: HOSPADM

## 2022-12-05 RX ORDER — ACETAMINOPHEN 325 MG/1
975 TABLET ORAL EVERY 8 HOURS
Status: DISCONTINUED | OUTPATIENT
Start: 2022-12-05 | End: 2022-12-07 | Stop reason: HOSPADM

## 2022-12-05 RX ORDER — NALOXONE HYDROCHLORIDE 0.4 MG/ML
0.4 INJECTION, SOLUTION INTRAMUSCULAR; INTRAVENOUS; SUBCUTANEOUS
Status: DISCONTINUED | OUTPATIENT
Start: 2022-12-05 | End: 2022-12-07 | Stop reason: HOSPADM

## 2022-12-05 RX ORDER — CETIRIZINE HYDROCHLORIDE 10 MG/1
10 TABLET ORAL DAILY
COMMUNITY

## 2022-12-05 RX ORDER — MEPERIDINE HYDROCHLORIDE 25 MG/ML
12.5 INJECTION INTRAMUSCULAR; INTRAVENOUS; SUBCUTANEOUS EVERY 5 MIN PRN
Status: DISCONTINUED | OUTPATIENT
Start: 2022-12-05 | End: 2022-12-05 | Stop reason: HOSPADM

## 2022-12-05 RX ORDER — PROPOFOL 10 MG/ML
INJECTION, EMULSION INTRAVENOUS PRN
Status: DISCONTINUED | OUTPATIENT
Start: 2022-12-05 | End: 2022-12-05

## 2022-12-05 RX ORDER — ONDANSETRON 2 MG/ML
4 INJECTION INTRAMUSCULAR; INTRAVENOUS EVERY 30 MIN PRN
Status: DISCONTINUED | OUTPATIENT
Start: 2022-12-05 | End: 2022-12-05 | Stop reason: HOSPADM

## 2022-12-05 RX ORDER — SODIUM CHLORIDE, SODIUM LACTATE, POTASSIUM CHLORIDE, CALCIUM CHLORIDE 600; 310; 30; 20 MG/100ML; MG/100ML; MG/100ML; MG/100ML
INJECTION, SOLUTION INTRAVENOUS CONTINUOUS
Status: DISCONTINUED | OUTPATIENT
Start: 2022-12-05 | End: 2022-12-05 | Stop reason: HOSPADM

## 2022-12-05 RX ORDER — MAGNESIUM SULFATE 4 G/50ML
4 INJECTION INTRAVENOUS ONCE
Status: COMPLETED | OUTPATIENT
Start: 2022-12-05 | End: 2022-12-05

## 2022-12-05 RX ORDER — ACETAMINOPHEN 325 MG/1
650 TABLET ORAL EVERY 4 HOURS PRN
Status: DISCONTINUED | OUTPATIENT
Start: 2022-12-08 | End: 2022-12-07 | Stop reason: HOSPADM

## 2022-12-05 RX ORDER — OXYCODONE HYDROCHLORIDE 5 MG/1
10 TABLET ORAL EVERY 4 HOURS PRN
Status: DISCONTINUED | OUTPATIENT
Start: 2022-12-05 | End: 2022-12-05 | Stop reason: HOSPADM

## 2022-12-05 RX ORDER — DIPHENHYDRAMINE HYDROCHLORIDE 50 MG/ML
25 INJECTION INTRAMUSCULAR; INTRAVENOUS EVERY 6 HOURS PRN
Status: DISCONTINUED | OUTPATIENT
Start: 2022-12-05 | End: 2022-12-07 | Stop reason: HOSPADM

## 2022-12-05 RX ORDER — NALOXONE HYDROCHLORIDE 0.4 MG/ML
0.2 INJECTION, SOLUTION INTRAMUSCULAR; INTRAVENOUS; SUBCUTANEOUS
Status: DISCONTINUED | OUTPATIENT
Start: 2022-12-05 | End: 2022-12-07 | Stop reason: HOSPADM

## 2022-12-05 RX ORDER — PROPRANOLOL HYDROCHLORIDE 40 MG/1
40 TABLET ORAL 2 TIMES DAILY
Status: DISCONTINUED | OUTPATIENT
Start: 2022-12-05 | End: 2022-12-07 | Stop reason: HOSPADM

## 2022-12-05 RX ORDER — BUPIVACAINE HYDROCHLORIDE AND EPINEPHRINE 2.5; 5 MG/ML; UG/ML
INJECTION, SOLUTION EPIDURAL; INFILTRATION; INTRACAUDAL; PERINEURAL PRN
Status: DISCONTINUED | OUTPATIENT
Start: 2022-12-05 | End: 2022-12-05 | Stop reason: HOSPADM

## 2022-12-05 RX ORDER — HYDROMORPHONE HCL IN WATER/PF 6 MG/30 ML
0.2 PATIENT CONTROLLED ANALGESIA SYRINGE INTRAVENOUS EVERY 5 MIN PRN
Status: DISCONTINUED | OUTPATIENT
Start: 2022-12-05 | End: 2022-12-05 | Stop reason: HOSPADM

## 2022-12-05 RX ORDER — FENTANYL CITRATE 50 UG/ML
25 INJECTION, SOLUTION INTRAMUSCULAR; INTRAVENOUS EVERY 5 MIN PRN
Status: DISCONTINUED | OUTPATIENT
Start: 2022-12-05 | End: 2022-12-05 | Stop reason: HOSPADM

## 2022-12-05 RX ORDER — OXYCODONE HYDROCHLORIDE 5 MG/1
10 TABLET ORAL EVERY 4 HOURS PRN
Status: DISCONTINUED | OUTPATIENT
Start: 2022-12-05 | End: 2022-12-06

## 2022-12-05 RX ORDER — OXYCODONE HYDROCHLORIDE 15 MG/1
15 TABLET ORAL EVERY 4 HOURS PRN
Status: DISCONTINUED | OUTPATIENT
Start: 2022-12-05 | End: 2022-12-06

## 2022-12-05 RX ORDER — OXYCODONE HYDROCHLORIDE 5 MG/1
5 TABLET ORAL EVERY 4 HOURS PRN
Status: DISCONTINUED | OUTPATIENT
Start: 2022-12-05 | End: 2022-12-05

## 2022-12-05 RX ORDER — AMOXICILLIN 250 MG
1 CAPSULE ORAL 2 TIMES DAILY
Status: DISCONTINUED | OUTPATIENT
Start: 2022-12-05 | End: 2022-12-07 | Stop reason: HOSPADM

## 2022-12-05 RX ORDER — ONDANSETRON 4 MG/1
4 TABLET, ORALLY DISINTEGRATING ORAL EVERY 30 MIN PRN
Status: DISCONTINUED | OUTPATIENT
Start: 2022-12-05 | End: 2022-12-05 | Stop reason: HOSPADM

## 2022-12-05 RX ORDER — HALOPERIDOL 5 MG/ML
1 INJECTION INTRAMUSCULAR
Status: DISCONTINUED | OUTPATIENT
Start: 2022-12-05 | End: 2022-12-05 | Stop reason: HOSPADM

## 2022-12-05 RX ORDER — BUPIVACAINE HYDROCHLORIDE AND EPINEPHRINE 2.5; 5 MG/ML; UG/ML
2 INJECTION, SOLUTION EPIDURAL; INFILTRATION; INTRACAUDAL; PERINEURAL ONCE
Status: DISCONTINUED | OUTPATIENT
Start: 2022-12-05 | End: 2022-12-05 | Stop reason: HOSPADM

## 2022-12-05 RX ORDER — FENTANYL CITRATE 50 UG/ML
INJECTION, SOLUTION INTRAMUSCULAR; INTRAVENOUS PRN
Status: DISCONTINUED | OUTPATIENT
Start: 2022-12-05 | End: 2022-12-05

## 2022-12-05 RX ORDER — FLUTICASONE PROPIONATE 50 MCG
1 SPRAY, SUSPENSION (ML) NASAL DAILY PRN
COMMUNITY

## 2022-12-05 RX ORDER — VANCOMYCIN HYDROCHLORIDE 1 G/20ML
1 INJECTION, POWDER, LYOPHILIZED, FOR SOLUTION INTRAVENOUS
Status: DISCONTINUED | OUTPATIENT
Start: 2022-12-05 | End: 2022-12-05 | Stop reason: HOSPADM

## 2022-12-05 RX ORDER — ACETAMINOPHEN 325 MG/1
975 TABLET ORAL ONCE
Status: COMPLETED | OUTPATIENT
Start: 2022-12-05 | End: 2022-12-05

## 2022-12-05 RX ORDER — BUPIVACAINE HYDROCHLORIDE AND EPINEPHRINE 2.5; 5 MG/ML; UG/ML
INJECTION, SOLUTION EPIDURAL; INFILTRATION; INTRACAUDAL; PERINEURAL
Status: DISCONTINUED
Start: 2022-12-05 | End: 2022-12-05 | Stop reason: HOSPADM

## 2022-12-05 RX ORDER — ACETAMINOPHEN 325 MG/1
975 TABLET ORAL EVERY 8 HOURS
Status: DISCONTINUED | OUTPATIENT
Start: 2022-12-05 | End: 2022-12-05 | Stop reason: DRUGHIGH

## 2022-12-05 RX ORDER — LOSARTAN POTASSIUM 50 MG/1
100 TABLET ORAL DAILY
Status: DISCONTINUED | OUTPATIENT
Start: 2022-12-05 | End: 2022-12-07 | Stop reason: HOSPADM

## 2022-12-05 RX ORDER — VANCOMYCIN HYDROCHLORIDE 1 G/20ML
INJECTION, POWDER, LYOPHILIZED, FOR SOLUTION INTRAVENOUS PRN
Status: DISCONTINUED | OUTPATIENT
Start: 2022-12-05 | End: 2022-12-05 | Stop reason: HOSPADM

## 2022-12-05 RX ORDER — CEFAZOLIN SODIUM/WATER 2 G/20 ML
2 SYRINGE (ML) INTRAVENOUS SEE ADMIN INSTRUCTIONS
Status: DISCONTINUED | OUTPATIENT
Start: 2022-12-05 | End: 2022-12-05 | Stop reason: HOSPADM

## 2022-12-05 RX ORDER — GABAPENTIN 300 MG/1
300 CAPSULE ORAL
Status: COMPLETED | OUTPATIENT
Start: 2022-12-05 | End: 2022-12-05

## 2022-12-05 RX ORDER — SODIUM CHLORIDE 9 MG/ML
INJECTION, SOLUTION INTRAVENOUS CONTINUOUS
Status: DISCONTINUED | OUTPATIENT
Start: 2022-12-05 | End: 2022-12-07 | Stop reason: HOSPADM

## 2022-12-05 RX ORDER — ONDANSETRON 4 MG/1
4 TABLET, ORALLY DISINTEGRATING ORAL EVERY 8 HOURS PRN
Status: DISCONTINUED | OUTPATIENT
Start: 2022-12-05 | End: 2022-12-05

## 2022-12-05 RX ORDER — HYDROMORPHONE HCL IN WATER/PF 6 MG/30 ML
0.4 PATIENT CONTROLLED ANALGESIA SYRINGE INTRAVENOUS
Status: DISCONTINUED | OUTPATIENT
Start: 2022-12-05 | End: 2022-12-05

## 2022-12-05 RX ORDER — KETAMINE HYDROCHLORIDE 10 MG/ML
INJECTION INTRAMUSCULAR; INTRAVENOUS PRN
Status: DISCONTINUED | OUTPATIENT
Start: 2022-12-05 | End: 2022-12-05

## 2022-12-05 RX ORDER — POLYETHYLENE GLYCOL 3350 17 G/17G
17 POWDER, FOR SOLUTION ORAL DAILY
Status: DISCONTINUED | OUTPATIENT
Start: 2022-12-06 | End: 2022-12-07 | Stop reason: HOSPADM

## 2022-12-05 RX ORDER — MULTIVITAMIN,THERAPEUTIC
1 TABLET ORAL DAILY
Status: DISCONTINUED | OUTPATIENT
Start: 2022-12-06 | End: 2022-12-07 | Stop reason: HOSPADM

## 2022-12-05 RX ORDER — ERGOCALCIFEROL 1.25 MG/1
50000 CAPSULE, LIQUID FILLED ORAL WEEKLY
COMMUNITY

## 2022-12-05 RX ORDER — OXYCODONE HYDROCHLORIDE 5 MG/1
5 TABLET ORAL EVERY 4 HOURS PRN
Status: DISCONTINUED | OUTPATIENT
Start: 2022-12-05 | End: 2022-12-05 | Stop reason: HOSPADM

## 2022-12-05 RX ORDER — VANCOMYCIN HYDROCHLORIDE 1 G/20ML
INJECTION, POWDER, LYOPHILIZED, FOR SOLUTION INTRAVENOUS
Status: DISCONTINUED
Start: 2022-12-05 | End: 2022-12-05 | Stop reason: HOSPADM

## 2022-12-05 RX ORDER — HYDROMORPHONE HCL IN WATER/PF 6 MG/30 ML
0.2 PATIENT CONTROLLED ANALGESIA SYRINGE INTRAVENOUS
Status: DISCONTINUED | OUTPATIENT
Start: 2022-12-05 | End: 2022-12-05

## 2022-12-05 RX ORDER — BISACODYL 10 MG
10 SUPPOSITORY, RECTAL RECTAL DAILY PRN
Status: DISCONTINUED | OUTPATIENT
Start: 2022-12-05 | End: 2022-12-07 | Stop reason: HOSPADM

## 2022-12-05 RX ORDER — LIDOCAINE 40 MG/G
CREAM TOPICAL
Status: DISCONTINUED | OUTPATIENT
Start: 2022-12-05 | End: 2022-12-05 | Stop reason: HOSPADM

## 2022-12-05 RX ORDER — HYDROMORPHONE HCL IN WATER/PF 6 MG/30 ML
0.4 PATIENT CONTROLLED ANALGESIA SYRINGE INTRAVENOUS EVERY 5 MIN PRN
Status: DISCONTINUED | OUTPATIENT
Start: 2022-12-05 | End: 2022-12-05 | Stop reason: HOSPADM

## 2022-12-05 RX ORDER — ONDANSETRON 4 MG/1
4 TABLET, ORALLY DISINTEGRATING ORAL EVERY 6 HOURS PRN
Status: DISCONTINUED | OUTPATIENT
Start: 2022-12-05 | End: 2022-12-07 | Stop reason: HOSPADM

## 2022-12-05 RX ORDER — PROCHLORPERAZINE MALEATE 10 MG
10 TABLET ORAL EVERY 6 HOURS PRN
Status: DISCONTINUED | OUTPATIENT
Start: 2022-12-05 | End: 2022-12-07 | Stop reason: HOSPADM

## 2022-12-05 RX ORDER — PHENYLEPHRINE HCL IN 0.9% NACL 50MG/250ML
PLASTIC BAG, INJECTION (ML) INTRAVENOUS
Status: DISCONTINUED
Start: 2022-12-05 | End: 2022-12-05 | Stop reason: WASHOUT

## 2022-12-05 RX ORDER — LORATADINE 10 MG/1
10 TABLET ORAL DAILY PRN
Status: DISCONTINUED | OUTPATIENT
Start: 2022-12-05 | End: 2022-12-07 | Stop reason: HOSPADM

## 2022-12-05 RX ORDER — CEFAZOLIN SODIUM 1 G/3ML
1 INJECTION, POWDER, FOR SOLUTION INTRAMUSCULAR; INTRAVENOUS EVERY 8 HOURS
Status: COMPLETED | OUTPATIENT
Start: 2022-12-05 | End: 2022-12-05

## 2022-12-05 RX ORDER — PANTOPRAZOLE SODIUM 20 MG/1
40 TABLET, DELAYED RELEASE ORAL DAILY
Status: DISCONTINUED | OUTPATIENT
Start: 2022-12-06 | End: 2022-12-07 | Stop reason: HOSPADM

## 2022-12-05 RX ORDER — CEFAZOLIN SODIUM/WATER 2 G/20 ML
2 SYRINGE (ML) INTRAVENOUS
Status: COMPLETED | OUTPATIENT
Start: 2022-12-05 | End: 2022-12-05

## 2022-12-05 RX ORDER — DIPHENHYDRAMINE HCL 25 MG
25 CAPSULE ORAL EVERY 6 HOURS PRN
Status: DISCONTINUED | OUTPATIENT
Start: 2022-12-05 | End: 2022-12-06

## 2022-12-05 RX ADMIN — SENNOSIDES AND DOCUSATE SODIUM 1 TABLET: 50; 8.6 TABLET ORAL at 20:49

## 2022-12-05 RX ADMIN — HYDROMORPHONE HYDROCHLORIDE 0.4 MG: 0.2 INJECTION, SOLUTION INTRAMUSCULAR; INTRAVENOUS; SUBCUTANEOUS at 14:53

## 2022-12-05 RX ADMIN — KETAMINE HYDROCHLORIDE 20 MG: 10 INJECTION, SOLUTION INTRAMUSCULAR; INTRAVENOUS at 07:50

## 2022-12-05 RX ADMIN — CETIRIZINE HYDROCHLORIDE 10 MG: 10 TABLET, FILM COATED ORAL at 12:49

## 2022-12-05 RX ADMIN — FENTANYL CITRATE 50 MCG: 50 INJECTION INTRAMUSCULAR; INTRAVENOUS at 10:59

## 2022-12-05 RX ADMIN — PROPRANOLOL HYDROCHLORIDE 40 MG: 40 TABLET ORAL at 20:48

## 2022-12-05 RX ADMIN — PROPOFOL 50 MG: 10 INJECTION, EMULSION INTRAVENOUS at 07:50

## 2022-12-05 RX ADMIN — FENTANYL CITRATE 50 MCG: 50 INJECTION INTRAMUSCULAR; INTRAVENOUS at 10:53

## 2022-12-05 RX ADMIN — HYDROMORPHONE HYDROCHLORIDE 0.4 MG: 0.2 INJECTION, SOLUTION INTRAMUSCULAR; INTRAVENOUS; SUBCUTANEOUS at 12:49

## 2022-12-05 RX ADMIN — OXYCODONE HYDROCHLORIDE 5 MG: 5 TABLET ORAL at 11:13

## 2022-12-05 RX ADMIN — PROPOFOL 150 MG: 10 INJECTION, EMULSION INTRAVENOUS at 07:28

## 2022-12-05 RX ADMIN — FENTANYL CITRATE 100 MCG: 50 INJECTION, SOLUTION INTRAMUSCULAR; INTRAVENOUS at 07:28

## 2022-12-05 RX ADMIN — ONDANSETRON 4 MG: 2 INJECTION INTRAMUSCULAR; INTRAVENOUS at 09:12

## 2022-12-05 RX ADMIN — FENTANYL CITRATE 50 MCG: 50 INJECTION INTRAMUSCULAR; INTRAVENOUS at 10:40

## 2022-12-05 RX ADMIN — SODIUM CHLORIDE, POTASSIUM CHLORIDE, SODIUM LACTATE AND CALCIUM CHLORIDE: 600; 310; 30; 20 INJECTION, SOLUTION INTRAVENOUS at 06:54

## 2022-12-05 RX ADMIN — OXYCODONE HYDROCHLORIDE 15 MG: 15 TABLET ORAL at 18:03

## 2022-12-05 RX ADMIN — HYDROMORPHONE HYDROCHLORIDE 1 MG: 1 INJECTION, SOLUTION INTRAMUSCULAR; INTRAVENOUS; SUBCUTANEOUS at 08:05

## 2022-12-05 RX ADMIN — KETAMINE HYDROCHLORIDE 30 MG: 10 INJECTION, SOLUTION INTRAMUSCULAR; INTRAVENOUS at 07:28

## 2022-12-05 RX ADMIN — OXYCODONE HYDROCHLORIDE 10 MG: 5 TABLET ORAL at 13:42

## 2022-12-05 RX ADMIN — ACETAMINOPHEN 975 MG: 325 TABLET ORAL at 12:53

## 2022-12-05 RX ADMIN — FENTANYL CITRATE 50 MCG: 50 INJECTION INTRAMUSCULAR; INTRAVENOUS at 10:47

## 2022-12-05 RX ADMIN — Medication 2 G: at 07:19

## 2022-12-05 RX ADMIN — LIDOCAINE HYDROCHLORIDE 20 ML: 10 INJECTION, SOLUTION INFILTRATION; PERINEURAL at 07:28

## 2022-12-05 RX ADMIN — CEFAZOLIN 1 G: 1 INJECTION, POWDER, FOR SOLUTION INTRAMUSCULAR; INTRAVENOUS at 22:54

## 2022-12-05 RX ADMIN — HYDROMORPHONE HYDROCHLORIDE 0.8 MG: 1 INJECTION, SOLUTION INTRAMUSCULAR; INTRAVENOUS; SUBCUTANEOUS at 16:53

## 2022-12-05 RX ADMIN — CEFAZOLIN 1 G: 1 INJECTION, POWDER, FOR SOLUTION INTRAMUSCULAR; INTRAVENOUS at 14:54

## 2022-12-05 RX ADMIN — OXYCODONE HYDROCHLORIDE 15 MG: 15 TABLET ORAL at 23:56

## 2022-12-05 RX ADMIN — MIDAZOLAM 2 MG: 1 INJECTION INTRAMUSCULAR; INTRAVENOUS at 07:22

## 2022-12-05 RX ADMIN — HYDROMORPHONE HYDROCHLORIDE 0.8 MG: 1 INJECTION, SOLUTION INTRAMUSCULAR; INTRAVENOUS; SUBCUTANEOUS at 20:54

## 2022-12-05 RX ADMIN — SODIUM CHLORIDE: 9 INJECTION, SOLUTION INTRAVENOUS at 15:54

## 2022-12-05 RX ADMIN — Medication 100 MG: at 07:28

## 2022-12-05 RX ADMIN — MAGNESIUM SULFATE HEPTAHYDRATE 4 G: 4 INJECTION, SOLUTION INTRAVENOUS at 06:53

## 2022-12-05 RX ADMIN — SODIUM CHLORIDE, POTASSIUM CHLORIDE, SODIUM LACTATE AND CALCIUM CHLORIDE: 600; 310; 30; 20 INJECTION, SOLUTION INTRAVENOUS at 09:59

## 2022-12-05 RX ADMIN — LOSARTAN POTASSIUM 100 MG: 50 TABLET, FILM COATED ORAL at 12:48

## 2022-12-05 RX ADMIN — GABAPENTIN 300 MG: 300 CAPSULE ORAL at 06:26

## 2022-12-05 RX ADMIN — ACETAMINOPHEN 975 MG: 325 TABLET ORAL at 06:25

## 2022-12-05 RX ADMIN — HYDROMORPHONE HYDROCHLORIDE 0.5 MG: 1 INJECTION, SOLUTION INTRAMUSCULAR; INTRAVENOUS; SUBCUTANEOUS at 10:12

## 2022-12-05 RX ADMIN — DIPHENHYDRAMINE HYDROCHLORIDE 25 MG: 25 CAPSULE ORAL at 18:06

## 2022-12-05 RX ADMIN — PROPRANOLOL HYDROCHLORIDE 40 MG: 20 TABLET ORAL at 06:59

## 2022-12-05 RX ADMIN — PROPOFOL 100 MCG/KG/MIN: 10 INJECTION, EMULSION INTRAVENOUS at 07:32

## 2022-12-05 ASSESSMENT — ACTIVITIES OF DAILY LIVING (ADL)
ADLS_ACUITY_SCORE: 37
ADLS_ACUITY_SCORE: 37
ADLS_ACUITY_SCORE: 39
ADLS_ACUITY_SCORE: 35
ADLS_ACUITY_SCORE: 39
ADLS_ACUITY_SCORE: 35
ADLS_ACUITY_SCORE: 39
ADLS_ACUITY_SCORE: 37
ADLS_ACUITY_SCORE: 35

## 2022-12-05 ASSESSMENT — LIFESTYLE VARIABLES: TOBACCO_USE: 1

## 2022-12-05 NOTE — ANESTHESIA POSTPROCEDURE EVALUATION
Patient: Dixon Good    Procedure: Procedure(s):  THORACIC 12 - LUMBAR 2 POSTERIOR INSTRUMENTED FUSION WITH STEALTH NAVIGATION       Anesthesia Type:  General    Note:  Disposition: Admission   Postop Pain Control: Uneventful            Sign Out: Well controlled pain   PONV: No   Neuro/Psych: Uneventful            Sign Out: Acceptable/Baseline neuro status   Airway/Respiratory: Uneventful            Sign Out: Acceptable/Baseline resp. status   CV/Hemodynamics: Uneventful            Sign Out: Acceptable CV status; No obvious hypovolemia; No obvious fluid overload   Other NRE: NONE   DID A NON-ROUTINE EVENT OCCUR? No           Last vitals:  Vitals Value Taken Time   /83 12/05/22 1130   Temp 37.3  C (99.1  F) 12/05/22 1028   Pulse 59 12/05/22 1138   Resp 12 12/05/22 1137   SpO2 98 % 12/05/22 1138   Vitals shown include unvalidated device data.    Electronically Signed By: AMBER BEARD MD  December 5, 2022  11:39 AM

## 2022-12-05 NOTE — PROGRESS NOTES
S: Dixon Good was seen at [facility], Room 344-01, for the evaluation, fit and delivery of a OTS TLSO.  The patient s Nurse and Spouse was present.    Dx: S/P THORACIC 12 - LUMBAR 2 POSTERIOR INSTRUMENTED FUSION WITH STEALTH NAVIGATION    O: The patient presents in 9/10 pain and didn't want to try on the brace at this time.  A measurement of the patient's waist was taken.  An [place] Horizon TLSO was set to the patient s waist size 40-inches (setting 3.25) and then fit to the patient's height.  This brace features a low-profile belt and a back panel that facilitates spinal support with controlled retraction of the patient s shoulders to reduce/prevent spinal flexion.      A:  I explained to the patient the purpose and function of the brace as well as donning and doffing.  I then provided the  s instructions to the patient.      P: If there is a need to adjust the brace while the patient still in the hospital, the patient s Nurse should contact the [facility].  Adjustments made outside of the hospital can be performed, at no charge, at any of the [facility].    G: The TLSO increases medial-lateral and anterior-posterior stability of the spine.  The TLSO also applies compression to the patient s soft tissues.  Compression of the patient s soft tissues serves to unload the vertebrae which reduces pain and promotes healing.  This brace also offers controlled retraction of the patient s shoulders to reduce/prevent spinal flexion.  The goal of this orthosis is to provide spinal stability, promote healing and reduce pain while the patient performs their ADLs.    Please contact the [facility] at [phone] if you have any questions.  Thank you, Florencio

## 2022-12-05 NOTE — ANESTHESIA PROCEDURE NOTES
Airway       Patient location during procedure: OR       Procedure Start/Stop Times: 12/5/2022 7:30 AM  Staff -        Anesthesiologist:  Angus Laws MD       CRNA: Esther Medel APRN CRNA       Performed By: CRNA  Consent for Airway        Urgency: elective  Indications and Patient Condition       Indications for airway management: airway protection       Induction type:intravenous       Mask difficulty assessment: 2 - vent by mask + OA or adjuvant +/- NMBA    Final Airway Details       Final airway type: endotracheal airway       Successful airway: ETT - single  Endotracheal Airway Details        ETT size (mm): 8.0       Cuffed: yes       Successful intubation technique: video laryngoscopy       VL Blade Size: Glidescope 4       Grade View of Cords: 1       Adjucts: stylet       Position: Center       Measured from: lips       Secured at (cm): 22       Bite block used: Molar (X 2)    Post intubation assessment        Placement verified by: capnometry, equal breath sounds and chest rise        Number of attempts at approach: 1       Number of other approaches attempted: 0       Secured with: silk tape       Ease of procedure: easy       Dentition: Intact and Unchanged    Medication(s) Administered   Medication Administration Time: 12/5/2022 7:30 AM

## 2022-12-05 NOTE — ANESTHESIA CARE TRANSFER NOTE
Patient: Dixon Good    Procedure: Procedure(s):  THORACIC 12 - LUMBAR 2 POSTERIOR INSTRUMENTED FUSION WITH STEALTH NAVIGATION       Diagnosis: Back pain [M54.9]  Diagnosis Additional Information: No value filed.    Anesthesia Type:   General     Note:    Oropharynx: oropharynx clear of all foreign objects  Level of Consciousness: drowsy  Oxygen Supplementation: face mask  Level of Supplemental Oxygen (L/min / FiO2): 8  Independent Airway: airway patency satisfactory and stable  Dentition: dentition unchanged  Vital Signs Stable: post-procedure vital signs reviewed and stable  Report to RN Given: handoff report given  Patient transferred to: PACU  Comments: Noted small,bruise bottom left lip  Handoff Report: Identifed the Patient, Identified the Reponsible Provider, Reviewed the pertinent medical history, Discussed the surgical course, Reviewed Intra-OP anesthesia mangement and issues during anesthesia, Set expectations for post-procedure period and Allowed opportunity for questions and acknowledgement of understanding      Vitals:  Vitals Value Taken Time   /86 12/05/22 1028   Temp 37.3  C (99.1  F) 12/05/22 1028   Pulse 61 12/05/22 1029   Resp 6 12/05/22 1029   SpO2 99 % 12/05/22 1029   Vitals shown include unvalidated device data.    Electronically Signed By: MUNIR MOSHER CRNA  December 5, 2022  10:30 AM

## 2022-12-05 NOTE — PROGRESS NOTES
12/05/22 1500   Appointment Info   Signing Clinician's Name / Credentials (PT) Lion Marcelo, PT, DPT   Quick Adds   Quick Adds Certification   Living Environment   People in Home spouse   Current Living Arrangements house   Home Accessibility no concerns   Self-Care   Usual Activity Tolerance good   Current Activity Tolerance good   Equipment Currently Used at Home cane, straight   Fall history within last six months no   General Information   Onset of Illness/Injury or Date of Surgery 12/05/22   Referring Physician Dr. Red   Patient/Family Therapy Goals Statement (PT) Decrease pain with mobility   Pertinent History of Current Problem (include personal factors and/or comorbidities that impact the POC) lumbar fusion   Existing Precautions/Restrictions spinal   Weight-Bearing Status - LLE weight-bearing as tolerated   Weight-Bearing Status - RLE weight-bearing as tolerated   Range of Motion (ROM)   ROM Comment WFL, decreased lumbar region due to pain and precautions   Strength (Manual Muscle Testing)   Strength Comments WFL   Bed Mobility   Bed Mobility supine-sit;sit-supine   Supine-Sit [place] (Bed Mobility) modified independence   Sit-Supine [place] (Bed Mobility) minimum assist (75% patient effort)   Transfers   Transfers sit-stand transfer   Sit-Stand Transfer   Sit-Stand [place] (Transfers) contact guard   Assistive Device (Sit-Stand Transfers) walker, front-wheeled   Gait/Stairs (Locomotion)   [place] Level (Gait) contact guard   Assistive Device (Gait) walker, front-wheeled   Distance in Feet 10'   Distance in Feet (Gait) 150'   Pattern (Gait) swing-through   Deviations/Abnormal Patterns (Gait) vicky decreased;gait speed decreased   Clinical Impression   Criteria for Skilled Therapeutic Intervention Yes, treatment indicated   PT Diagnosis (PT) Impaired functional mobility   Influenced by the following impairments Pain   Functional limitations due to impairments Transfers, gait    Clinical Presentation (PT Evaluation Complexity) Stable/Uncomplicated   Clinical Presentation Rationale Pt presents as medically diagnosed   Clinical Decision Making (Complexity) low complexity   Planned Therapy Interventions (PT) gait training;home exercise program;patient/family education;ROM (range of motion);strengthening;transfer training   Anticipated Equipment Needs at Discharge (PT) walker, rolling   Risk & Benefits of therapy have been explained care plan/treatment goals reviewed;patient   PT Total Evaluation Time   PT Eval, Low Complexity Minutes (28978) 5   Plan of Care Review   Plan of Care Reviewed With patient;spouse   Therapy Certification   Start of care date 12/05/22   Certification date from 12/05/22   Certification date to 01/05/23   Medical Diagnosis lumbar fusion   Physical Therapy Goals   PT Frequency 2x/day   PT Predicted Duration/Target Date for Goal Attainment 12/09/22   PT Goals Transfers;Gait;PT Goal 1   PT: Transfers Supervision/stand-by assist;Sit to/from stand;Within precautions   PT: Gait Supervision/stand-by assist;Rolling walker;Greater than 200 feet   PT: Goal 1 Independent with HEP for spinal rehab following initial cues and education   Interventions   Interventions Quick Adds Gait Training;Therapeutic Activity   Therapeutic Activity   Therapeutic Activities: dynamic activities to improve functional performance Minutes (91605) 15   Symptoms Noted During/After Treatment Increased pain   Treatment Detail/Skilled Intervention Supine to sit Mod I with HOB elevated and bed rail use, educated on log roll technique and precautions related to all movements. Sitting EOB SBA, sit<>stand CGA, standing for a couple of minutes prior to amb, no adverse s/s. Increased time for set up and due to pt getting up for the first time.   Gait Training   Gait Training Minutes (42679) 10   Symptoms Noted During/After Treatment (Gait Training) increased pain   Treatment Detail/Skilled Intervention Pt amb  well in the halls CGA with FWW, 9/10 pain not impacted by mobility, moving slowly with significant weight through UEs but no LOB or adverse s/s. Encouraged pt to amb again tonight with nursing. Cues for safety, posture, and technique as well as activity modulation.   [place] Level (Gait Training) contact guard   Physical Assistance Level (Gait Training) supervision;verbal cues   Weight Bearing (Gait Training) weight-bearing as tolerated   Assistive Device (Gait Training) rolling walker   Pattern Analysis (Gait Training) swing-through gait   Gait Analysis Deviations decreased vicky;decreased step length   PT Discharge Planning   PT Plan Increase amb, transfers, spinal HEP   PT Discharge Recommendation (DC Rec) (S)  home with assist   PT Rationale for DC Rec Spouse at home to assist as needed, ambulating well, in high amounts of pain, should be clear to d/c from mobility standpoint when medically cleared   PT Brief overview of current status Ax1 for all mobility for safety, high pain levels, lives with spouse, amb 150' with FWW   [facility]  OUTPATIENT PHYSICAL THERAPY EVALUATION  PLAN OF TREATMENT FOR OUTPATIENT REHABILITATION  (COMPLETE FOR INITIAL CLAIMS ONLY)  Patient's Last Name, First Name, M.I.  YOB: 1965  Dixon Good                        Provider's Name  [facility] Medical Record No.  [id number]                             Onset Date:  12/05/22   Start of Care Date:  (P) 12/05/22   Type:     _X_PT   ___OT   ___SLP Medical Diagnosis:  (P) lumbar fusion              PT Diagnosis:  Impaired functional mobility Visits from SOC:  1     See note for plan of treatment, functional goals and certification details    I CERTIFY THE NEED FOR THESE SERVICES FURNISHED UNDER        THIS PLAN OF TREATMENT AND WHILE UNDER MY CARE     (Physician co-signature of this document indicates review and certification of the therapy plan).

## 2022-12-05 NOTE — PLAN OF CARE
Patient vital signs are at baseline: Yes  Patient able to ambulate as they were prior to admission or with assist devices provided by therapies during their stay:  Yes  Patient MUST void prior to discharge:  Yes  Patient able to tolerate oral intake:  Yes  Pain has adequate pain control using Oral analgesics:  No,  Reason:  Patient rating pain 9/10 despite PRN oxycodone and IV dilaudid x2.   Does patient have an identified :  Yes  Has goal D/C date and time been discussed with patient:  Yes    Surgical dressing is CDI. Patient voiding and 1 PVR has been completed. Tolerating a regular diet. Ambulating with assist x 1, gait belt and walker. Hemovac drain is in place and 150 mLs emptied this shift.     [facility] paged at 14:30 due to patient requesting a nicotine patch, PRN hydroxyzine and to update on pain management options being ineffective. No return call as of now.

## 2022-12-05 NOTE — ANESTHESIA PREPROCEDURE EVALUATION
Anesthesia Pre-Procedure Evaluation    Patient: Dixon Good   MRN: [id number] : 1965        Procedure : Procedure(s):  THORACIC 12 - LUMBAR 2 POSTERIOR INSTRUMENTED FUSION WITH STEALTH NAVIGATION          Past Medical History:   Diagnosis Date     Dumping syndrome      Hypertension      Hypoglycemia       Past Surgical History:   Procedure Laterality Date     APPENDECTOMY       ARTHROSCOPY SHOULDER, OPEN ROTATOR CUFF REPAIR, COMBINED  2013    Procedure: COMBINED ARTHROSCOPY SHOULDER, OPEN ROTATOR CUFF REPAIR;  Left shoulder arthroscopy mini-open rotator cuff repair ;  Surgeon: Biju Saenz MD;  Location: [facility] OR     BACK SURGERY       CHOLECYSTECTOMY       LAMINECTOMY CERIVCAL POSTERIOR ONE LEVEL N/A 2022    Procedure: CERVICAL 3 LAMINECTOMY;  Surgeon: Otoniel Red MD;  Location: [facility] Main OR     LAMINOPLASTY CERVICAL POSTERIOR TWO LEVELS N/A 2022    Procedure: CERVICAL LAMINOPLASTY CERVICAL 4 - CERVICAL 6,;  Surgeon: Otoniel Red MD;  Location: [facility] Main OR     STOMACH SURGERY       VASECTOMY        Allergies   Allergen Reactions     Flagyl [Metronidazole] Headache     migraines      Social History     Tobacco Use     Smoking status: Never     Smokeless tobacco: Current     Types: Chew   Substance Use Topics     Alcohol use: Yes      Wt Readings from Last 1 Encounters:   22 73.9 kg (163 lb)        Anesthesia Evaluation   Pt has had prior anesthetic.     No history of anesthetic complications       ROS/MED HX  ENT/Pulmonary:     (+) tobacco use,     Neurologic:     (+) migraines,     Cardiovascular:     (+) hypertension-----    METS/Exercise Tolerance:     Hematologic:  - neg hematologic  ROS     Musculoskeletal: Comment: Hx of cervical fusion       GI/Hepatic: Comment: Hx of dumping syndrome 2/2 Nissen fundoplication     (+) GERD, Asymptomatic on medication,     Renal/Genitourinary:  - neg Renal ROS     Endo:  - neg endo ROS      Psychiatric/Substance Use:  - neg psychiatric ROS     Infectious Disease:  - neg infectious disease ROS     Malignancy:  - neg malignancy ROS     Other:  - neg other ROS          Physical Exam    Airway  airway exam normal      Mallampati: I   TM distance: > 3 FB   Neck ROM: full   Mouth opening: > 3 cm    Respiratory Devices and Support         Dental  no notable dental history         Cardiovascular   cardiovascular exam normal       Rhythm and rate: regular and normal     Pulmonary   pulmonary exam normal        breath sounds clear to auscultation           OUTSIDE LABS:  CBC:   Lab Results   Component Value Date    WBC 6.2 04/02/2019    HGB 11.9 (L) 03/29/2022    HGB 15.4 04/02/2019    HCT 46.3 04/02/2019     04/02/2019     BMP:   Lab Results   Component Value Date     03/29/2022     04/02/2019    POTASSIUM 3.9 03/29/2022    POTASSIUM 4.1 04/02/2019    CHLORIDE 110 (H) 03/29/2022    CHLORIDE 101 04/02/2019    CO2 22 03/29/2022    CO2 28 04/02/2019    BUN 8 03/29/2022    BUN 9 04/02/2019    CR 0.82 03/29/2022    CR 0.90 04/02/2019     03/29/2022    GLC 86 04/02/2019     COAGS: No results found for: PTT, INR, FIBR  POC: No results found for: BGM, HCG, HCGS  HEPATIC:   Lab Results   Component Value Date    ALBUMIN 4.0 04/02/2019    PROTTOTAL 7.2 04/02/2019    ALT 21 04/02/2019    AST 20 04/02/2019    ALKPHOS 61 04/02/2019    BILITOTAL 0.4 04/02/2019     OTHER:   Lab Results   Component Value Date    ABRIL 7.9 (L) 03/29/2022    MAG 2.6 04/02/2019    TSH 0.78 04/02/2019    CRP 2.4 04/02/2019    SED 6 04/02/2019       Anesthesia Plan    ASA Status:  2   NPO Status:  NPO Appropriate    Anesthesia Type: General.     - Airway: ETT   Induction: Intravenous, Propofol.   Maintenance: TIVA.   Techniques and Equipment:     - Airway: Video-Laryngoscope     - Lines/Monitors: 2nd IV     Consents    Anesthesia Plan(s) and associated risks, benefits, and realistic alternatives discussed. Questions  answered and patient/representative(s) expressed understanding.    - Discussed:     - Discussed with:  Patient         Postoperative Care    Pain management: IV analgesics, Oral pain medications, Multi-modal analgesia.   PONV prophylaxis: Ondansetron (or other 5HT-3), Dexamethasone or Solumedrol, Droperidol or Haldol     Comments:    Other Comments: TIVA for neuromonitoring with propofol and precedex infusions   Phenylephrine infusion in lorena BEARD MD

## 2022-12-05 NOTE — OP NOTE
Orthopedic  Operative Note    Pre-operative diagnosis: 1.  Back pain secondary to focal kyphosis at L1 compression fracture    Post-operative diagnosis: 1.  Back pain secondary to focal kyphosis at L1 compression fracture    Procedure: 1.  T12-L2 posterior instrumented lumbar fusion with Medtronic Solera instrumentation   2.  EMG, SSEP, MEP neuro monitoring   3.  O-arm with Stealth navigation   4.  Locally harvested autograft and allograft bone grafting    Surgeon: Otoniel Red MD    Assistant(s): Sheila Lindsey PA-C is an experienced first surgical assistant whose assistance was necessary for patient positioning, hemostasis, soft tissue and neural retraction, closure, and safe progression of surgery.    Anesthesia: General endotracheal anesthesia    Estimated blood loss: 100 mL     Drains: Hemovac    Specimens: None    Indications:                               The patient is a very pleasant 57-year-old gentleman who presented my clinic with a longstanding history of back pain centered around an old L1 compression fracture sustained many decades ago.  This is gotten progressively worse and was interfering with patient's quality of life.  After trialing a reasonable course of conservative measures, patient elected for surgical intervention in the form of a T12-L2 posterior lumbar fusion.    I again reviewed the operative indications, the general and specific risks, benefits, and rehabilitation issues. Details of the procedure and postoperative recovery is highlighted. Patient and attending family appear to understand the issues and express their consent proceed.     Findings: Fairly rigid kyphosis    Complications: None     Procedure Detail: Patient was evaluated in the preoperative holding area.  He was given the opportunity ask questions guarding the procedure in detail, and provided informed consent to proceed.  His back was marked with the surgeon's initials at the anticipated level of the incision.  He  was brought back to the operative suite on a gurney.  There, he was inducted under general anesthesia by our anesthesia colleagues.  He was placed prone onto a four-point Tj frame.  All bony prominences were carefully padded.  Back was prepped and draped in the typical sterile fashion.  A preprocedural pause was performed to identify the correct patient, laterality, procedure be performed, as well as administration of preoperative antibiotics.    I then initiated the procedure by inserting a paramedian spinal needle at the anticipated level of the incision.  I obtain a crosstable lateral x-ray to reference her appropriate skin incision.  I am pretty injected the skin using 0.25% Marcaine with epinephrine.  I incised the skin using a 10 blade.  I dissected through subcuticular layers using electrocautery and divided either side of the spinous processes at the fascia using electrocautery.  I used a [place] to subperiosteally dissected tissues off the side of the spinous processes and lamina bilaterally.  I placed a [place] 4 at the anticipated L1-2 junction and obtained a crosstable lateral x-ray confirming appropriate position of her surgical site.  I was able to dissected out to the tips of the transverse processes at L1 and L2.  Once dissection had been completed, I placed a O-arm tower onto the T11 spinous process.  I then draped the patient and obtained a sterile O-arm spin.  I was able to clearly visualize the patient's kyphotic fracture deformity in the O-arm spin which again confirmed appropriate location of our surgical site.    I then set about placing screws.  In each pedicle, I followed the following sequence.  First, I obtained a  hole using a navigated bur tip.  I then used a navigated awl tip tap to undertapped the trajectory of the screw.  I then palpated the trajectory of the screw, confirming there were no obvious breaches in the solid floor.  Once this was completed, I placed the screw  measured to each patient's individual pedicle length.  All screws had excellent bony purchase.  Once the screws have been placed, I stimulated each screw, and all screws stimulated above a 20.  I then decorticated the patient's facet joints at T12-L1 and L1-L2.  I decorticated the patient's transverse processes at L1 and L2.  I copiously irrigated the wound.  I placed rods bilaterally.  I did attempt a slight reduction of the patient's deformity, but it was quite rigid.  After final tightening the set plugs, I obtained a final AP and lateral x-ray using the O-arm confirming appropriate positioning of the hardware.  I removed the O-arm tower.  I then removed the patient's L1 and the inferior portion of the T12 spinous process for bone graft purposes.  This was morselized and combined with nina and cancellous chips into the lateral gutters at L1-L2 and along the lamina and facet joints at T12-L1.  I then placed a deep drain.  Hemostasis was obtained using electrocautery and floseal, with minimal oozing noted at time of closure. I placed 1 mg of vancomycin powder in the wound.  The fascia was closed using 1 Vicryl suture, the subcuticular layer was closed using 2-0 Vicryl suture, and the skin was closed using a 3-0 STRATAFIX suture.  The skin was then covered with skin glue and a Primapore dressing was applied.  The patient tolerated the procedure without any complication.  All sponge and needle counts were correct at the end.  EMG, MEP, SSEP neuro monitoring was stable throughout procedure.    Condition: Stable     Weight bearing status: Weight bearing as tolerated     Activity:      Anticoagulation plan:    Plan:      Otoniel Red MD  [facility]  Date:  12/5/2022  9:54 AM   Activity as tolerated  Patient may move about with assist as indicated or with supervision    Ambulation and mechanical prophylaxis.    Patient will mobilize for DVT prophylaxis.  He will receive postoperative Ancef for  antibiotic prophylaxis.  The patient will be on strict no heavy lifting, twisting, bending requirements for the next 3 months.  He will wear a TLSO brace when delivered when out of bed.  He does not need to wait for TLSO delivery prior to mobilizing.  He will follow-up with me in 2 weeks in clinic for a follow-up visit.

## 2022-12-05 NOTE — PHARMACY-ADMISSION MEDICATION HISTORY
Pharmacy Note - Admission Medication History    Pertinent Provider Information: has flonase with   ______________________________________________________________________    Prior To Admission (PTA) med list completed and updated in EMR.       PTA Med List   Medication Sig Last Dose     cetirizine (ZYRTEC) 10 MG tablet Take 10 mg by mouth daily 12/4/2022     diphenhydrAMINE-acetaminophen (TYLENOL PM)  MG tablet Take 1-2 tablets by mouth nightly as needed PRN     fluticasone (FLONASE) 50 MCG/ACT nasal spray Spray 1 spray into both nostrils daily as needed for rhinitis or allergies PRN at has with     losartan (COZAAR) 100 MG tablet Take 100 mg by mouth daily 12/4/2022 at am     Melatonin 10 MG TABS tablet Take 30 mg by mouth nightly as needed PRN     omeprazole (PRILOSEC) 20 MG DR capsule Take 20 mg by mouth daily 12/5/2022 at am     ondansetron (ZOFRAN-ODT) 4 MG ODT tab Take 4 mg by mouth every 8 hours as needed for nausea or vomiting PRN     propranolol (INDERAL) 40 MG tablet Take 40 mg by mouth 2 times daily 12/4/2022 at pm     SUMAtriptan (IMITREX) 50 MG tablet Take  mg by mouth at onset of headache for migraine More than a month     vitamin D2 (ERGOCALCIFEROL) 66008 units (1250 mcg) capsule Take 50,000 Units by mouth once a week 11/30/2022       Information source(s): Patient and CareEverywhere/[facility]    Method of interview communication: in-person    Patient was asked about OTC/herbal products specifically.  PTA med list reflects this.    Based on the pharmacist's assessment, the PTA med list information appears reliable    Allergies were reviewed, assessed, and updated with the patient.      Medications available for use during hospital stay: flonase.      Thank you for the opportunity to participate in the care of this patient.      Noy Lema [facility]     12/5/2022     6:41 AM

## 2022-12-06 ENCOUNTER — APPOINTMENT (OUTPATIENT)
Dept: OCCUPATIONAL THERAPY | Facility: CLINIC | Age: 57
DRG: 458 | End: 2022-12-06
Attending: ORTHOPAEDIC SURGERY
Payer: COMMERCIAL

## 2022-12-06 ENCOUNTER — APPOINTMENT (OUTPATIENT)
Dept: PHYSICAL THERAPY | Facility: CLINIC | Age: 57
DRG: 458 | End: 2022-12-06
Attending: ORTHOPAEDIC SURGERY
Payer: COMMERCIAL

## 2022-12-06 PROBLEM — Z98.1 S/P FUSION OF THORACIC SPINE: Status: ACTIVE | Noted: 2022-12-06

## 2022-12-06 LAB
ANION GAP SERPL CALCULATED.3IONS-SCNC: 6 MMOL/L (ref 5–18)
BUN SERPL-MCNC: 10 MG/DL (ref 8–22)
CALCIUM SERPL-MCNC: 8.7 MG/DL (ref 8.5–10.5)
CHLORIDE BLD-SCNC: 102 MMOL/L (ref 98–107)
CO2 SERPL-SCNC: 27 MMOL/L (ref 22–31)
CREAT SERPL-MCNC: 0.82 MG/DL (ref 0.7–1.3)
ERYTHROCYTE [DISTWIDTH] IN BLOOD BY AUTOMATED COUNT: 13.8 % (ref 10–15)
GFR SERPL CREATININE-BSD FRML MDRD: >90 ML/MIN/1.73M2
GLUCOSE BLD-MCNC: 119 MG/DL (ref 70–125)
HCT VFR BLD AUTO: 41.1 % (ref 40–53)
HGB BLD-MCNC: 13.5 G/DL (ref 13.3–17.7)
MCH RBC QN AUTO: 31.5 PG (ref 26.5–33)
MCHC RBC AUTO-ENTMCNC: 32.8 G/DL (ref 31.5–36.5)
MCV RBC AUTO: 96 FL (ref 78–100)
PLATELET # BLD AUTO: 277 10E3/UL (ref 150–450)
POTASSIUM BLD-SCNC: 4.7 MMOL/L (ref 3.5–5)
RBC # BLD AUTO: 4.29 10E6/UL (ref 4.4–5.9)
SODIUM SERPL-SCNC: 135 MMOL/L (ref 136–145)
WBC # BLD AUTO: 13.5 10E3/UL (ref 4–11)

## 2022-12-06 PROCEDURE — 97116 GAIT TRAINING THERAPY: CPT | Mod: GP

## 2022-12-06 PROCEDURE — 250N000013 HC RX MED GY IP 250 OP 250 PS 637: Performed by: ORTHOPAEDIC SURGERY

## 2022-12-06 PROCEDURE — 120N000001 HC R&B MED SURG/OB

## 2022-12-06 PROCEDURE — 250N000013 HC RX MED GY IP 250 OP 250 PS 637: Performed by: NURSE PRACTITIONER

## 2022-12-06 PROCEDURE — 97165 OT EVAL LOW COMPLEX 30 MIN: CPT | Mod: GO

## 2022-12-06 PROCEDURE — 82310 ASSAY OF CALCIUM: CPT | Performed by: ORTHOPAEDIC SURGERY

## 2022-12-06 PROCEDURE — 97110 THERAPEUTIC EXERCISES: CPT | Mod: GP

## 2022-12-06 PROCEDURE — 36415 COLL VENOUS BLD VENIPUNCTURE: CPT | Performed by: ORTHOPAEDIC SURGERY

## 2022-12-06 PROCEDURE — 85027 COMPLETE CBC AUTOMATED: CPT | Performed by: ORTHOPAEDIC SURGERY

## 2022-12-06 PROCEDURE — 97535 SELF CARE MNGMENT TRAINING: CPT | Mod: GO

## 2022-12-06 PROCEDURE — 250N000011 HC RX IP 250 OP 636: Performed by: ORTHOPAEDIC SURGERY

## 2022-12-06 RX ORDER — ACETAMINOPHEN 500 MG
500-1000 TABLET ORAL EVERY 4 HOURS PRN
Refills: 0 | COMMUNITY
Start: 2022-12-08

## 2022-12-06 RX ORDER — HYDROMORPHONE HYDROCHLORIDE 2 MG/1
2-6 TABLET ORAL EVERY 4 HOURS PRN
Qty: 28 TABLET | Refills: 0 | Status: SHIPPED | OUTPATIENT
Start: 2022-12-06 | End: 2022-12-07

## 2022-12-06 RX ORDER — HYDROXYZINE HYDROCHLORIDE 25 MG/1
25 TABLET, FILM COATED ORAL EVERY 6 HOURS PRN
Qty: 20 TABLET | Refills: 0 | Status: SHIPPED | OUTPATIENT
Start: 2022-12-06

## 2022-12-06 RX ORDER — HYDROMORPHONE HYDROCHLORIDE 2 MG/1
2-4 TABLET ORAL EVERY 4 HOURS PRN
Status: DISCONTINUED | OUTPATIENT
Start: 2022-12-06 | End: 2022-12-07 | Stop reason: HOSPADM

## 2022-12-06 RX ORDER — AMOXICILLIN 250 MG
1 CAPSULE ORAL 2 TIMES DAILY
Refills: 0 | COMMUNITY
Start: 2022-12-06

## 2022-12-06 RX ORDER — HYDROXYZINE HYDROCHLORIDE 25 MG/1
50 TABLET, FILM COATED ORAL EVERY 6 HOURS PRN
Status: DISCONTINUED | OUTPATIENT
Start: 2022-12-06 | End: 2022-12-07 | Stop reason: HOSPADM

## 2022-12-06 RX ORDER — HYDROXYZINE HYDROCHLORIDE 25 MG/1
25 TABLET, FILM COATED ORAL EVERY 6 HOURS PRN
Status: DISCONTINUED | OUTPATIENT
Start: 2022-12-06 | End: 2022-12-07 | Stop reason: HOSPADM

## 2022-12-06 RX ORDER — HYDROMORPHONE HYDROCHLORIDE 2 MG/1
2 TABLET ORAL EVERY 4 HOURS PRN
Status: DISCONTINUED | OUTPATIENT
Start: 2022-12-06 | End: 2022-12-06

## 2022-12-06 RX ADMIN — CETIRIZINE HYDROCHLORIDE 10 MG: 10 TABLET, FILM COATED ORAL at 09:45

## 2022-12-06 RX ADMIN — PANTOPRAZOLE SODIUM 40 MG: 20 TABLET, DELAYED RELEASE ORAL at 09:45

## 2022-12-06 RX ADMIN — HYDROMORPHONE HYDROCHLORIDE 0.4 MG: 0.2 INJECTION, SOLUTION INTRAMUSCULAR; INTRAVENOUS; SUBCUTANEOUS at 09:44

## 2022-12-06 RX ADMIN — LOSARTAN POTASSIUM 100 MG: 50 TABLET, FILM COATED ORAL at 09:45

## 2022-12-06 RX ADMIN — HYDROMORPHONE HYDROCHLORIDE 0.4 MG: 0.2 INJECTION, SOLUTION INTRAMUSCULAR; INTRAVENOUS; SUBCUTANEOUS at 17:17

## 2022-12-06 RX ADMIN — HYDROMORPHONE HYDROCHLORIDE 2 MG: 2 TABLET ORAL at 20:15

## 2022-12-06 RX ADMIN — ACETAMINOPHEN 975 MG: 325 TABLET ORAL at 20:14

## 2022-12-06 RX ADMIN — DIPHENHYDRAMINE HYDROCHLORIDE 25 MG: 50 INJECTION INTRAMUSCULAR; INTRAVENOUS at 10:07

## 2022-12-06 RX ADMIN — HYDROMORPHONE HYDROCHLORIDE 0.4 MG: 0.2 INJECTION, SOLUTION INTRAMUSCULAR; INTRAVENOUS; SUBCUTANEOUS at 03:14

## 2022-12-06 RX ADMIN — HYDROXYZINE HYDROCHLORIDE 50 MG: 25 TABLET, FILM COATED ORAL at 20:15

## 2022-12-06 RX ADMIN — HYDROMORPHONE HYDROCHLORIDE 2 MG: 2 TABLET ORAL at 13:51

## 2022-12-06 RX ADMIN — HYDROXYZINE HYDROCHLORIDE 25 MG: 25 TABLET, FILM COATED ORAL at 13:52

## 2022-12-06 RX ADMIN — THERA TABS 1 TABLET: TAB at 09:45

## 2022-12-06 RX ADMIN — HYDROMORPHONE HYDROCHLORIDE 2 MG: 2 TABLET ORAL at 21:04

## 2022-12-06 RX ADMIN — PROPRANOLOL HYDROCHLORIDE 40 MG: 40 TABLET ORAL at 09:44

## 2022-12-06 RX ADMIN — SENNOSIDES AND DOCUSATE SODIUM 1 TABLET: 50; 8.6 TABLET ORAL at 09:45

## 2022-12-06 RX ADMIN — POLYETHYLENE GLYCOL 3350 17 G: 17 POWDER, FOR SOLUTION ORAL at 09:44

## 2022-12-06 RX ADMIN — SENNOSIDES AND DOCUSATE SODIUM 1 TABLET: 50; 8.6 TABLET ORAL at 20:15

## 2022-12-06 RX ADMIN — PROPRANOLOL HYDROCHLORIDE 40 MG: 40 TABLET ORAL at 20:15

## 2022-12-06 RX ADMIN — OXYCODONE HYDROCHLORIDE 15 MG: 15 TABLET ORAL at 06:37

## 2022-12-06 ASSESSMENT — ACTIVITIES OF DAILY LIVING (ADL)
ADLS_ACUITY_SCORE: 35
ADLS_ACUITY_SCORE: 32
ADLS_ACUITY_SCORE: 35
ADLS_ACUITY_SCORE: 22
ADLS_ACUITY_SCORE: 35
ADLS_ACUITY_SCORE: 22
ADLS_ACUITY_SCORE: 35
ADLS_ACUITY_SCORE: 35

## 2022-12-06 NOTE — PLAN OF CARE
Problem: Plan of Care - These are the overarching goals to be used throughout the patient stay.    Goal: Optimal Comfort and Wellbeing  Outcome: Progressing  Problem: Spinal Surgery  Goal: Optimal Coping with Surgery  Outcome: Progressing  Problem: Spinal Surgery  Goal: Optimal Pain Control and Function  Outcome: Progressing  Problem: Spinal Surgery  Goal: Optimal Functional Ability  Outcome: Progressing   Goal Outcome Evaluation:  Pt alert/oriented x4. SBA gaitbelt walker. PIV saline locked. Hemovac draining bright red/bloody output 75mL. Pain control with icepacks, scheduled medications, PIV dilaudid, PO dilaudid, hydroxizine; refusing acetaminophen. PIV benadryl admin with effective results to decrease itching. Bed alarm on, call light wihtin reach. Pending discharge with vac drain less than 50mL/8hr.

## 2022-12-06 NOTE — PLAN OF CARE
Problem: Plan of Care - These are the overarching goals to be used throughout the patient stay.    Goal: Optimal Comfort and Wellbeing  Intervention: Monitor Pain and Promote Comfort     Problem: Spinal Surgery  Goal: Optimal Functional Ability  Intervention: Optimize Functional Status   Goal Outcome Evaluation:  Patient vital signs are at baseline: Yes  Patient able to ambulate as they were prior to admission or with assist devices provided by therapies during their stay:  Yes  Patient MUST void prior to discharge:  Yes  Patient able to tolerate oral intake:  Yes  Pain has adequate pain control using Oral analgesics:  No,  Reason:  Utilized IV Dilaudid  Does patient have an identified :  Yes  Has goal D/C date and time been discussed with patient:  No,  Reason:  Pending medical clearance  Patient is alert and oriented X 4. Pain controlled with PRN oral and IV medications. Voiding adequate amount. Output from Hemovac was 110 ml. IV saline locked. Bed alarm activated for safety.

## 2022-12-06 NOTE — PLAN OF CARE
Occupational Therapy Discharge Summary    Reason for therapy discharge:    All goals and outcomes met, no further needs identified.    Progress towards therapy goal(s). See goals on Care Plan in [facility] electronic health record for goal details.  Goals met    Therapy recommendation(s):    No further therapy is recommended.

## 2022-12-06 NOTE — PROGRESS NOTES
[facility]   Spine Progress Note - Lumbar    Post-operative Day: 1 Day Post-Op    Procedure(s):  THORACIC 12 - LUMBAR 2 POSTERIOR INSTRUMENTED FUSION WITH STEALTH NAVIGATION    Plan: Anticoagulation protocol:    Mechanical and/or ambulation                Pain medications: Oral dilaudid instead of oxycodone. Hydroxyzine effective for itching with opioid medications per patient. IV Benadryl still available as needed for unmanaged itching. Tylenol               Weight bearing status:  WBAT            Disposition:home this afternoon vs. Tomorrow based on drainage output (<50 ml per 8 hours x 1).              Continue cares and rehabilitation.  TLSO brace when out of bed.    Subjective:  Bryan is seated in bed. He sits up well as needed.   Pain: moderate to severe. Oxycodone ineffective. Notes itching when using opioid medications. Recommend   Pain location: thoracolumbar junction, described as deep dull aching to sharp pain.   Chest pain, SOB:  none  Nausea/vomiting:  none  Neuro:  No numbness, tingling, or weakness reported in bilateral LE      Objective:    Vital signs in last 24 hours  Temp:  [97.4  F (36.3  C)-98.2  F (36.8  C)] 98  F (36.7  C)  Pulse:  [56-90] 68  Resp:  [13-18] 18  BP: (107-132)/(57-97) 130/83  SpO2:  [93 %-98 %] 94 %  Wt Readings from Last 1 Encounters:   12/05/22 73.9 kg (163 lb)     Temp Readings from Last 1 Encounters:   12/06/22 98  F (36.7  C) (Oral)     BP Readings from Last 1 Encounters:   12/06/22 130/83     Pulse Readings from Last 1 Encounters:   12/06/22 68       Wound status: dressings are clean dry and intact. yes   Circulation: Dorsalis pedis pulse 2+ bilaterally  Motor:  5/5 strength hip flexors and adductors, quads, AT, EHL, and gastrocs bilateral LE  Sensation: Intact sensation bilateral LE L2-S1, no LE swelling  Calf tenderness:  bilateral  none      Pertinent Labs     Lab Results: personally reviewed.   Lab Results   Component Value Date    WBC 13.5 (H) 12/06/2022     HGB 13.5 12/06/2022    HCT 41.1 12/06/2022    MCV 96 12/06/2022     12/06/2022     No results for input(s): INR in the last 168 hours.       Report completed by:  Ace Jones NP  Date: 12/6/2022  Time: 11:23 AM

## 2022-12-06 NOTE — PROGRESS NOTES
12/06/22 1104   Appointment Info   Signing Clinician's Name / Credentials (OT) INGA Zayas   Living Environment   People in Home spouse   Current Living Arrangements house   Home Accessibility no concerns   Transportation Anticipated family or friend will provide   Living Environment Comments Pt has cane, tub shower w/ shower chair, RTS   Self-Care   Usual Activity Tolerance good   Current Activity Tolerance good   Equipment Currently Used at Home cane, straight;raised toilet seat   Fall history within last six months no   Activity/Exercise/Self-Care Comment Pt IND w/ ADLs and IADLs at baseline   General Information   Onset of Illness/Injury or Date of Surgery 12/05/22   Referring Physician Otoniel Red MD   Additional Occupational Profile Info/Pertinent History of Current Problem fusion of thoracic spine   Existing Precautions/Restrictions spinal   Cognitive Status Examination   Orientation Status orientation to person, place and time   Visual Perception   Visual Impairment/Limitations WNL   Sensory   Sensory Quick Adds sensation intact   Pain Assessment   Patient Currently in Pain No   Posture   Posture not impaired   Range of Motion Comprehensive   General Range of Motion no range of motion deficits identified   Strength Comprehensive (MMT)   General Manual Muscle Testing (MMT) Assessment no strength deficits identified   Muscle Tone Assessment   Muscle Tone Quick Adds No deficits were identified   Coordination   Upper Extremity Coordination No deficits were identified   Bed Mobility   Bed Mobility scooting/bridging;supine-sit;sit-supine   Comment (Bed Mobility) SBA for bed mobility   Transfers   Transfers bed-chair transfer;toilet transfer;shower transfer   Transfer Comments SBA-CGA for transfers   Activities of Daily Living   BADL Assessment/Intervention bathing;lower body dressing;toileting   Bathing Assessment/Intervention   [place] Level (Bathing) contact guard assist   Lower Body  Dressing Assessment/Training   [place] Level (Lower Body Dressing) minimum assist (75% patient effort)   Toileting   [place] Level (Toileting) supervision   Clinical Impression   Criteria for Skilled Therapeutic Interventions Met (OT) Yes, treatment indicated   OT Diagnosis decreased ADLs   Influenced by the following impairments thoracic spine fusion   OT Problem List-Impairments impacting ADL activity tolerance impaired;post-surgical precautions   Assessment of Occupational Performance 1-3 Performance Deficits   Identified Performance Deficits LE dressing, bathing, bed mobility, transfers   Planned Therapy Interventions (OT) ADL retraining;bed mobility training;transfer training   Clinical Decision Making Complexity (OT) low complexity   Risk & Benefits of therapy have been explained evaluation/treatment results reviewed;patient;spouse/significant other   OT Total Evaluation Time   OT Eval, Low Complexity Minutes (13220) 10   OT Goals   Therapy Frequency (OT) One time eval and treatment   OT Predicted Duration/Target Date for Goal Attainment 12/06/22   OT Goals Lower Body Dressing;Bed Mobility;Transfers   OT: Lower Body Dressing Modified independent;within precautions;Goal Met;Completed   OT: Bed Mobility Modified independent;supine to/from sitting;rolling;bridging;within precautions;Goal Met;Completed   OT: Transfer Modified independent;with assistive device;within precautions;Goal Met;Completed  (tub shower)   Interventions   Interventions Quick Adds Self-Care/Home Management   Self-Care/Home Management   Self-Care/Home Mgmt/ADL, Compensatory, Meal Prep Minutes (66725) 18   Symptoms Noted During/After Treatment (Meal Preparation/Planning Training) none   Treatment Detail/Skilled Intervention Pt edu on spinal px - pt verbalized understanding. Pt edu on log roll technique for bed mobility - completed Mod I. Pt instructed on compensatory strategies for LE dressing - completed Mod I while seated EOB using  figure 4 position. Pt STS Mod I w/ FWW and amb. 60 ftx2 to ADL suite w/ FWW Mod I. Pt edu on safety technique for tub shower transfer - pt completed x3 w/ shower chair - recommended pt use his shower chair at home. Pt edu on safe RTS transfer - completed x3. Edu handout given on ADLs w/in spinal px. Edu on car transfers - pt verbalized understanding. Pt ended session in recliner.   OT Discharge Planning   OT Plan DC OT   OT Discharge Recommendation (DC Rec) (S)  home with assist   OT Rationale for DC Rec Pt tolerating therapy well and completing ADLs w/in spinal px. Pt getting a reacher for home and has all other DME. Pt's spouse will be home to assist as needed.   OT Brief overview of current status Mod I w/ ADLs.   Total Session Time   Timed Code Treatment Minutes 18   Total Session Time (sum of timed and untimed services) 28

## 2022-12-06 NOTE — PLAN OF CARE
Problem: Plan of Care - These are the overarching goals to be used throughout the patient stay.    Goal: Absence of Hospital-Acquired Illness or Injury  Intervention: Identify and Manage Fall Risk  Recent Flowsheet Documentation  Taken 12/5/2022 1600 by Denise Inman RN  Safety Promotion/Fall Prevention:    activity supervised    bed alarm on    clutter free environment maintained    increased rounding and observation    fall prevention program maintained    mobility aid in reach    nonskid shoes/slippers when out of bed    patient and family education    Bryan is steady on his feet and a one assist with walker.  He has ambulated in the morrison, in his room and the morrison.  Tolerated well.      Problem: Plan of Care - These are the overarching goals to be used throughout the patient stay.    Goal: Optimal Comfort and Wellbeing  Intervention: Monitor Pain and Promote Comfort  Recent Flowsheet Documentation  Taken 12/5/2022 1600 by Denise Inman RN  Pain Management Interventions:    MD notified (comment)    aromatherapy    ambulation/increased activity    care clustered    cold applied    emotional support    pain management plan reviewed with patient/caregiver    pillow support provided    quiet environment facilitated    relaxation techniques promoted    repositioned    rest   Bryan's pain management is better.  His pain meds were increased earlier in the shift.  He appears comfortable and is able to ambulate.  Continue to monitor.    Problem: Spinal Surgery  Goal: Effective Urinary Elimination  Outcome: Progressing    Bryan has voided many times and has had the 3 PVRs completed.     Goal Outcome Evaluation:      Bryan is tolerating his diet, ambulating, voiding.  He got benadryl ordered for the itching he gets from the narcotics.  He states it helped a little but he still has itching.  Monitor the hemovac output.  Depending on the drain output he may discharge home with his wife on Tuesday or stay for one more  night and discharge Wednesday.

## 2022-12-07 VITALS
BODY MASS INDEX: 24.14 KG/M2 | HEART RATE: 60 BPM | HEIGHT: 69 IN | DIASTOLIC BLOOD PRESSURE: 78 MMHG | OXYGEN SATURATION: 95 % | RESPIRATION RATE: 18 BRPM | WEIGHT: 163 LBS | SYSTOLIC BLOOD PRESSURE: 113 MMHG | TEMPERATURE: 98.4 F

## 2022-12-07 LAB
ERYTHROCYTE [DISTWIDTH] IN BLOOD BY AUTOMATED COUNT: 13.9 % (ref 10–15)
HCT VFR BLD AUTO: 38.8 % (ref 40–53)
HGB BLD-MCNC: 12.9 G/DL (ref 13.3–17.7)
MCH RBC QN AUTO: 31.9 PG (ref 26.5–33)
MCHC RBC AUTO-ENTMCNC: 33.2 G/DL (ref 31.5–36.5)
MCV RBC AUTO: 96 FL (ref 78–100)
PLATELET # BLD AUTO: 231 10E3/UL (ref 150–450)
RBC # BLD AUTO: 4.04 10E6/UL (ref 4.4–5.9)
WBC # BLD AUTO: 9.5 10E3/UL (ref 4–11)

## 2022-12-07 PROCEDURE — 250N000013 HC RX MED GY IP 250 OP 250 PS 637: Performed by: ORTHOPAEDIC SURGERY

## 2022-12-07 PROCEDURE — 85027 COMPLETE CBC AUTOMATED: CPT | Performed by: ORTHOPAEDIC SURGERY

## 2022-12-07 PROCEDURE — 250N000013 HC RX MED GY IP 250 OP 250 PS 637: Performed by: NURSE PRACTITIONER

## 2022-12-07 PROCEDURE — 36415 COLL VENOUS BLD VENIPUNCTURE: CPT | Performed by: ORTHOPAEDIC SURGERY

## 2022-12-07 RX ORDER — HYDROMORPHONE HYDROCHLORIDE 2 MG/1
2-4 TABLET ORAL EVERY 4 HOURS PRN
Qty: 28 TABLET | Refills: 0 | Status: SHIPPED | OUTPATIENT
Start: 2022-12-07

## 2022-12-07 RX ADMIN — POLYETHYLENE GLYCOL 3350 17 G: 17 POWDER, FOR SOLUTION ORAL at 08:10

## 2022-12-07 RX ADMIN — PROPRANOLOL HYDROCHLORIDE 40 MG: 40 TABLET ORAL at 08:10

## 2022-12-07 RX ADMIN — HYDROMORPHONE HYDROCHLORIDE 4 MG: 2 TABLET ORAL at 05:50

## 2022-12-07 RX ADMIN — CETIRIZINE HYDROCHLORIDE 10 MG: 10 TABLET, FILM COATED ORAL at 08:10

## 2022-12-07 RX ADMIN — SENNOSIDES AND DOCUSATE SODIUM 1 TABLET: 50; 8.6 TABLET ORAL at 08:10

## 2022-12-07 RX ADMIN — LOSARTAN POTASSIUM 100 MG: 50 TABLET, FILM COATED ORAL at 08:10

## 2022-12-07 RX ADMIN — PANTOPRAZOLE SODIUM 40 MG: 20 TABLET, DELAYED RELEASE ORAL at 08:10

## 2022-12-07 RX ADMIN — THERA TABS 1 TABLET: TAB at 08:10

## 2022-12-07 RX ADMIN — HYDROXYZINE HYDROCHLORIDE 50 MG: 25 TABLET, FILM COATED ORAL at 05:50

## 2022-12-07 RX ADMIN — ACETAMINOPHEN 975 MG: 325 TABLET ORAL at 05:50

## 2022-12-07 RX ADMIN — MAGNESIUM HYDROXIDE 30 ML: 400 SUSPENSION ORAL at 05:50

## 2022-12-07 ASSESSMENT — ACTIVITIES OF DAILY LIVING (ADL)
ADLS_ACUITY_SCORE: 22

## 2022-12-07 NOTE — PROGRESS NOTES
Pt discharged to home with wife for transport. AVS reviewed/completed with pt and wife. All pt belongings remain with pt at discharge. VSS. Hemavac/PIV removed

## 2022-12-07 NOTE — PROVIDER NOTIFICATION
Nurse spoke with  On-call provider Brenden about pt's unrelieved pain with 2 mg of PO dilaudid. Brenden increased Pt's PO dilaudid to 2-4 mg, see orders for details.

## 2022-12-07 NOTE — DISCHARGE SUMMARY
[facility] Discharge Summary                                  [facility]     [address] [address] [id number]   Age: 57 year old  PCP: Citlalli Mejia, [phone] 1965     Date of Admission:  12/5/2022  Date of Discharge::  12/7/2022 11:48 AM  Discharge Provider:  Ace Jones NP    Code status:  Full Code    Admission Information:  Admission Diagnosis:  Back pain [M54.9]  S/P fusion of thoracic spine [Z98.1]    Post-Operative Day: 2 Days Post-Op     Reason for admission:  The patient was admitted for the following:Procedure(s) (LRB):  THORACIC 12 - LUMBAR 2 POSTERIOR INSTRUMENTED FUSION WITH STEALTH NAVIGATION (N/A)    Principal Problem:    S/P fusion of thoracic spine      Allergies:  Flagyl [metronidazole]    Following the procedure noted above the patient was transferred to the post-op floor and started on:    Therapy:  physical therapy and occupational therapy  Anticoagulation Plan: Mechanical and/or ambulation   Pain Management: scopainmedication: dilaudid, tylenol and vistaril  Weight bearing status: Weight bearing as tolerated     The patient was followed by Orthopedics during the inpatient treatment course:  Complications:  High drain output and high post op pain. Improved and resolved from POD 1.   Acute Blood Loss Anemia: non symptomatic, no further treatment or monitoring necessary at this time.   Additional consultations:  Social work        Pertinent Labs   Lab Results: personally reviewed.     Recent Labs   Lab Test 12/07/22  0509 12/06/22  0559 03/29/22  0607 04/02/19  1523   HGB 12.9* 13.5 11.9* 15.4   HCT 38.8* 41.1  --  46.3   MCV 96 96  --  96    277  --  319   NA  --  135* 138 140   CRP  --   --   --  2.4          Discharge Information:  Condition at discharge: Stable  Discharge destination:  Discharged to home     Medications at discharge:  Discharge Medication List as of 12/7/2022 11:13 AM      START taking these medications    Details   acetaminophen  (TYLENOL) 500 MG tablet Take 1-2 tablets (500-1,000 mg) by mouth every 4 hours as needed for other (For optimal non-opioid multimodal pain management to improve pain control.), R-0, OTC      hydrOXYzine (ATARAX) 25 MG tablet Take 1 tablet (25 mg) by mouth every 6 hours as needed for other (adjuvant pain), Disp-20 tablet, R-0, E-Prescribe      senna-docusate (SENOKOT-S/PERICOLACE) 8.6-50 MG tablet Take 1 tablet by mouth 2 times daily Take while taking opioid medications and until bowels are back to normal routine. Hold for loose stools, R-0, OTC         CONTINUE these medications which have CHANGED    Details   HYDROmorphone (DILAUDID) 2 MG tablet Take 1-2 tablets (2-4 mg) by mouth every 4 hours as needed for moderate pain (4-6) or severe pain (7-10) Take 1-2 pills for moderate pain rate 4-6/10 and Take 2-3 pills for severe pain rated 7-10/10., Disp-28 tablet, R-0, E-Prescribe         CONTINUE these medications which have NOT CHANGED    Details   cetirizine (ZYRTEC) 10 MG tablet Take 10 mg by mouth daily, Historical      diphenhydrAMINE-acetaminophen (TYLENOL PM)  MG tablet Take 1-2 tablets by mouth nightly as needed, Historical      fluticasone (FLONASE) 50 MCG/ACT nasal spray Spray 1 spray into both nostrils daily as needed for rhinitis or allergies, Historical      losartan (COZAAR) 100 MG tablet Take 100 mg by mouth daily, Historical      Melatonin 10 MG TABS tablet Take 30 mg by mouth nightly as needed, Historical      omeprazole (PRILOSEC) 20 MG DR capsule Take 20 mg by mouth daily, Historical      ondansetron (ZOFRAN-ODT) 4 MG ODT tab Take 4 mg by mouth every 8 hours as needed for nausea or vomiting, Historical      propranolol (INDERAL) 40 MG tablet Take 40 mg by mouth 2 times daily, Historical      SUMAtriptan (IMITREX) 50 MG tablet Take  mg by mouth at onset of headache for migraine, Historical      vitamin D2 (ERGOCALCIFEROL) 11774 units (1250 mcg) capsule Take 50,000 Units by mouth once a  week, Historical                          Follow-Up Care:  Patient should be seen in the office in 10-14 days by the Orthopedic Surgeon/Physician Assistant.  Call [phone] for appointment or questions.    It was my pleasure to take care of the above patient.  Ace Jones, NP

## 2022-12-07 NOTE — PROGRESS NOTES
Care Management Discharge Note    Discharge Date: 12/07/2022       Discharge Disposition:  Home no needs    Discharge Services:  n/a    Discharge DME:  n/a    Discharge Transportation: family or friend will provide    Private pay costs discussed: Not applicable    Education Provided on the Discharge Plan:  yes  Persons Notified of Discharge Plans: yes  Patient/Family in Agreement with the Plan:  yes    Handoff Referral Completed: No    Additional Information:  No needs anticipated from CM at discharge per pt; independent at baseline. Pt to follow up with PCP post discharge if needs arise. Family to transport home.  8:08 AM    SANDY Flores  12/7/2022

## 2022-12-07 NOTE — PROGRESS NOTES
[facility]   Spine Progress Note - Lumbar    Post-operative Day: 2 Day Post-Op    Procedure(s):  THORACIC 12 - LUMBAR 2 POSTERIOR INSTRUMENTED FUSION WITH STEALTH NAVIGATION    Plan: Anticoagulation protocol:    Mechanical and/or ambulation                Pain medications: Oral dilaudid instead of oxycodone. Hydroxyzine effective for itching with opioid medications per patient. Patient refusing tylenol as he feels this is ineffective for his pain            Weight bearing status:  WBAT            Disposition: home today.            Continue cares and rehabilitation.  TLSO brace when out of bed.    Subjective:  Bryan is seated at the edge of the bed. He sits up well and reports greater pain management today than yesterday. Having less drainage.   Pain: moderate.   Pain location: thoracolumbar junction.  Chest pain, SOB:  none  Nausea/vomiting:  none  Neuro:  No numbness, tingling, or weakness reported in bilateral LE      Objective:    Vital signs in last 24 hours  Temp:  [98.4  F (36.9  C)-98.8  F (37.1  C)] 98.4  F (36.9  C)  Pulse:  [60-65] 60  Resp:  [16-18] 18  BP: (111-115)/(71-78) 113/78  SpO2:  [95 %] 95 %  Wt Readings from Last 1 Encounters:   12/05/22 73.9 kg (163 lb)     Temp Readings from Last 1 Encounters:   12/07/22 98.4  F (36.9  C) (Oral)     BP Readings from Last 1 Encounters:   12/07/22 113/78     Pulse Readings from Last 1 Encounters:   12/07/22 60       Wound status: dressings are clean dry and intact. yes   Circulation: Dorsalis pedis pulse 2+ bilaterally.   Motor:  5/5 strength hip flexors and adductors, quads, AT, EHL, and gastrocs bilateral LE  Sensation: Intact sensation bilateral LE L2-S1, no LE swelling  Calf tenderness:  bilateral  none      Pertinent Labs     Lab Results: personally reviewed.   Lab Results   Component Value Date    WBC 9.5 12/07/2022    HGB 12.9 (L) 12/07/2022    HCT 38.8 (L) 12/07/2022    MCV 96 12/07/2022     12/07/2022     No results for input(s):  INR in the last 168 hours.       Report completed by:  Ace Jones, NP

## 2022-12-07 NOTE — PLAN OF CARE
Problem: Plan of Care - These are the overarching goals to be used throughout the patient stay.    Goal: Plan of Care Review  Description: The Plan of Care Review/Shift note should be completed every shift.  The Outcome Evaluation is a brief statement about your assessment that the patient is improving, declining, or no change.  This information will be displayed automatically on your shift note.  Outcome: Progressing   Goal Outcome Evaluation:  Pt's vital signs are stable, see flowsheet for details. Pt is voiding without difficulty. Pt's pain is controlled with oral pain medications after nurse reached out to on-call ortho to increase PO dilaudid. Ice applied. Pt has scant serosanguinous dried drainage on dressing. CMS intact. Up SBA. Pt is tolerating diet. Calls appropriately and can make needs known. Pt's alarms on. Nurse will continue to monitor. Pt's last BM was on 12/04/2022. Bowel sounds active in all quadrants. Pt denies abdominal pain. Pt is passing flatus.Nurse will administer PRN milk of mag in morning, per pt request.

## (undated) DEVICE — SUTURE VICRYL+ 1 18 CT/CR  VLT VCP753D

## (undated) DEVICE — SOL WATER IRRIG 1000ML BOTTLE 2F7114

## (undated) DEVICE — SYR 20ML LL W/O NDL 302830

## (undated) DEVICE — ADH SKIN CLOSURE PREMIERPRO EXOFIN 1.0ML 3470

## (undated) DEVICE — WRAP LUMBAR COMPRESS COLD THERAPY 4632P

## (undated) DEVICE — ESU PENCIL SMOKE EVAC W/ROCKER SWITCH 0703-047-000

## (undated) DEVICE — Device

## (undated) DEVICE — CATH IV ANGIO INTRO 14GA X 1 3/4" 381467

## (undated) DEVICE — TAPE ADH POROUS 3IN CURITY STD 7046C

## (undated) DEVICE — GOWN LG DISP 9515

## (undated) DEVICE — RX SURGIFLO HEMOSTATIC MATRIX 8ML 2991

## (undated) DEVICE — SPONGE NEURO 1/2X1/2 WECK 200100

## (undated) DEVICE — PIN MAYFIELD SKULL DISP A1083

## (undated) DEVICE — POSITIONER ARM CRADLE LAMINECTOMY DISP

## (undated) DEVICE — SUCTION TIP YANKAUER W/O VENT K86

## (undated) DEVICE — SU MONOCRYL 3-0 PS-2 18" UND MCP497G

## (undated) DEVICE — MARKER SPHERES PASSIVE MEDT PACK 5 8801075

## (undated) DEVICE — SOL NACL 0.9% INJ 500ML BAG 2B1323Q

## (undated) DEVICE — ESU ELEC BLADE 2.75" COATED/INSULATED E1455

## (undated) DEVICE — DRAPE STERI 23X17 NON STERILE 1010NSD

## (undated) DEVICE — SUTURE VICRYL+ 2-0 18 CT1/CR VLT VCP839D

## (undated) DEVICE — PACK MINOR SINGLE BASIN SSK3001

## (undated) DEVICE — SOL NACL 0.9% IRRIG 1000ML BOTTLE 2F7124

## (undated) DEVICE — TOOL DISSECT MIDAS MR8 14CM MATCH HEAD 3MM MR8-14MH30

## (undated) DEVICE — IMPLANTABLE DEVICE: Type: IMPLANTABLE DEVICE | Site: SPINE CERVICAL | Status: NON-FUNCTIONAL

## (undated) DEVICE — PREP CHLORAPREP W/ORANGE TINT 10.5ML 930715

## (undated) DEVICE — DRSG TEGADERM 4X4 3/4" 1626W

## (undated) DEVICE — DRAPE BACK TABLE PADDED 60X90

## (undated) DEVICE — CATH TRAY FOLEY SURESTEP 16FR DRAIN BAG STATOCK A899916

## (undated) DEVICE — CUSTOM PACK LUMBAR FUSION SNE5BLFHEA

## (undated) DEVICE — SU STRATAFIX MONOCRYL 3-0 SPIRAL PS-2 30CM SXMP1B106

## (undated) DEVICE — GOWN XLG DISP 9545

## (undated) DEVICE — SU SILK 2-0 FS-1 18" 685G

## (undated) DEVICE — ESU BIPOLAR SEALER AQUAMANTYS 6MM 23-112-1

## (undated) DEVICE — DRSG PRIMAPORE 03 1/8X6" 66000318

## (undated) DEVICE — GLOVE BIOGEL PI SZ 7.5 40875

## (undated) DEVICE — DRSG DRAIN 4X4" 7086

## (undated) DEVICE — COLLAR CERV UNIV 3 1/2 0814-0381

## (undated) DEVICE — SUTURE VICRYL+ 1 8-27 CT-1/CR VLT VCPP

## (undated) DEVICE — GLOVE UNDER INDICATOR PI SZ 7.0 LF 41670

## (undated) DEVICE — TOOL DISSECT MIDAS MR8 14CM BALL 2MM DIA MR8-14BA20

## (undated) DEVICE — DRAPE SHEET THYROID 77X123 89255

## (undated) DEVICE — SUCTION MANIFOLD NEPTUNE 2 SYS 1 PORT 702-025-000

## (undated) DEVICE — GLOVE BIOGEL PI ULTRATOUCH G SZ 6.5 42165

## (undated) DEVICE — CUSHION INSERT LG PRONE VIEW JACKSON TABLE

## (undated) DEVICE — DRAPE C-ARM 60X42" 1013

## (undated) DEVICE — ESU ELEC BLADE 6" COATED E1450-6

## (undated) DEVICE — CELL SAVER

## (undated) DEVICE — IOM CASE FLAT FEE

## (undated) DEVICE — SYR BULB IRRIG DOVER 60 ML LATEX FREE 67000

## (undated) DEVICE — IOM STANDARD SUPPLY FEE

## (undated) DEVICE — DRAPE SHEET REV FOLD 3/4 9349

## (undated) DEVICE — SYR 03ML LL W/O NDL 309657

## (undated) DEVICE — PLATE GROUNDING ADULT W/CORD 9165L

## (undated) RX ORDER — PROPOFOL 10 MG/ML
INJECTION, EMULSION INTRAVENOUS
Status: DISPENSED
Start: 2022-12-05

## (undated) RX ORDER — LIDOCAINE HYDROCHLORIDE 10 MG/ML
INJECTION, SOLUTION EPIDURAL; INFILTRATION; INTRACAUDAL; PERINEURAL
Status: DISPENSED
Start: 2022-03-28

## (undated) RX ORDER — PROPOFOL 10 MG/ML
INJECTION, EMULSION INTRAVENOUS
Status: DISPENSED
Start: 2022-03-28

## (undated) RX ORDER — DEXAMETHASONE SODIUM PHOSPHATE 10 MG/ML
INJECTION, EMULSION INTRAMUSCULAR; INTRAVENOUS
Status: DISPENSED
Start: 2022-12-05

## (undated) RX ORDER — ONDANSETRON 2 MG/ML
INJECTION INTRAMUSCULAR; INTRAVENOUS
Status: DISPENSED
Start: 2022-03-28

## (undated) RX ORDER — FENTANYL CITRATE 50 UG/ML
INJECTION, SOLUTION INTRAMUSCULAR; INTRAVENOUS
Status: DISPENSED
Start: 2022-03-28

## (undated) RX ORDER — FENTANYL CITRATE 50 UG/ML
INJECTION, SOLUTION INTRAMUSCULAR; INTRAVENOUS
Status: DISPENSED
Start: 2022-12-05

## (undated) RX ORDER — CEFAZOLIN SODIUM 1 G/3ML
INJECTION, POWDER, FOR SOLUTION INTRAMUSCULAR; INTRAVENOUS
Status: DISPENSED
Start: 2022-03-28

## (undated) RX ORDER — ONDANSETRON 2 MG/ML
INJECTION INTRAMUSCULAR; INTRAVENOUS
Status: DISPENSED
Start: 2022-12-05

## (undated) RX ORDER — LIDOCAINE HYDROCHLORIDE 10 MG/ML
INJECTION, SOLUTION EPIDURAL; INFILTRATION; INTRACAUDAL; PERINEURAL
Status: DISPENSED
Start: 2022-12-05